# Patient Record
Sex: MALE | Race: WHITE | NOT HISPANIC OR LATINO | Employment: OTHER | URBAN - METROPOLITAN AREA
[De-identification: names, ages, dates, MRNs, and addresses within clinical notes are randomized per-mention and may not be internally consistent; named-entity substitution may affect disease eponyms.]

---

## 2018-09-24 ENCOUNTER — APPOINTMENT (OUTPATIENT)
Dept: LAB | Facility: HOSPITAL | Age: 63
End: 2018-09-24
Payer: MEDICARE

## 2018-09-24 ENCOUNTER — TRANSCRIBE ORDERS (OUTPATIENT)
Dept: ADMINISTRATIVE | Facility: HOSPITAL | Age: 63
End: 2018-09-24

## 2018-09-24 DIAGNOSIS — Z12.5 SPECIAL SCREENING FOR MALIGNANT NEOPLASM OF PROSTATE: ICD-10-CM

## 2018-09-24 DIAGNOSIS — E78.5 HYPERLIPIDEMIA, UNSPECIFIED HYPERLIPIDEMIA TYPE: ICD-10-CM

## 2018-09-24 DIAGNOSIS — E55.9 AVITAMINOSIS D: ICD-10-CM

## 2018-09-24 DIAGNOSIS — N18.30 CHRONIC KIDNEY DISEASE, STAGE III (MODERATE) (HCC): ICD-10-CM

## 2018-09-24 DIAGNOSIS — N18.30 CHRONIC KIDNEY DISEASE, STAGE III (MODERATE) (HCC): Primary | ICD-10-CM

## 2018-09-24 DIAGNOSIS — I10 ESSENTIAL HYPERTENSION, MALIGNANT: ICD-10-CM

## 2018-09-24 LAB
25(OH)D3 SERPL-MCNC: 27.8 NG/ML (ref 30–100)
ALBUMIN SERPL BCP-MCNC: 3.7 G/DL (ref 3.5–5)
ALP SERPL-CCNC: 73 U/L (ref 46–116)
ALT SERPL W P-5'-P-CCNC: 66 U/L (ref 12–78)
ANION GAP SERPL CALCULATED.3IONS-SCNC: 7 MMOL/L (ref 4–13)
AST SERPL W P-5'-P-CCNC: 61 U/L (ref 5–45)
BACTERIA UR QL AUTO: ABNORMAL /HPF
BASOPHILS # BLD AUTO: 0.03 THOUSANDS/ΜL (ref 0–0.1)
BASOPHILS NFR BLD AUTO: 0 % (ref 0–1)
BILIRUB SERPL-MCNC: 0.6 MG/DL (ref 0.2–1)
BILIRUB UR QL STRIP: NEGATIVE
BUN SERPL-MCNC: 23 MG/DL (ref 5–25)
CALCIUM SERPL-MCNC: 8.8 MG/DL (ref 8.3–10.1)
CHLORIDE SERPL-SCNC: 102 MMOL/L (ref 100–108)
CHOLEST SERPL-MCNC: 275 MG/DL (ref 50–200)
CLARITY UR: CLEAR
CO2 SERPL-SCNC: 31 MMOL/L (ref 21–32)
COLOR UR: YELLOW
CREAT SERPL-MCNC: 1.8 MG/DL (ref 0.6–1.3)
EOSINOPHIL # BLD AUTO: 0.07 THOUSAND/ΜL (ref 0–0.61)
EOSINOPHIL NFR BLD AUTO: 1 % (ref 0–6)
ERYTHROCYTE [DISTWIDTH] IN BLOOD BY AUTOMATED COUNT: 14.2 % (ref 11.6–15.1)
GFR SERPL CREATININE-BSD FRML MDRD: 39 ML/MIN/1.73SQ M
GLUCOSE P FAST SERPL-MCNC: 93 MG/DL (ref 65–99)
GLUCOSE UR STRIP-MCNC: NEGATIVE MG/DL
HCT VFR BLD AUTO: 33.2 % (ref 36.5–49.3)
HDLC SERPL-MCNC: 38 MG/DL (ref 40–60)
HGB BLD-MCNC: 11 G/DL (ref 12–17)
HGB UR QL STRIP.AUTO: NEGATIVE
IMM GRANULOCYTES # BLD AUTO: 0.02 THOUSAND/UL (ref 0–0.2)
IMM GRANULOCYTES NFR BLD AUTO: 0 % (ref 0–2)
KETONES UR STRIP-MCNC: NEGATIVE MG/DL
LDLC SERPL CALC-MCNC: 211 MG/DL (ref 0–100)
LEUKOCYTE ESTERASE UR QL STRIP: NEGATIVE
LYMPHOCYTES # BLD AUTO: 1.93 THOUSANDS/ΜL (ref 0.6–4.47)
LYMPHOCYTES NFR BLD AUTO: 27 % (ref 14–44)
MCH RBC QN AUTO: 32.4 PG (ref 26.8–34.3)
MCHC RBC AUTO-ENTMCNC: 33.1 G/DL (ref 31.4–37.4)
MCV RBC AUTO: 98 FL (ref 82–98)
MONOCYTES # BLD AUTO: 0.42 THOUSAND/ΜL (ref 0.17–1.22)
MONOCYTES NFR BLD AUTO: 6 % (ref 4–12)
NEUTROPHILS # BLD AUTO: 4.78 THOUSANDS/ΜL (ref 1.85–7.62)
NEUTS SEG NFR BLD AUTO: 66 % (ref 43–75)
NITRITE UR QL STRIP: NEGATIVE
NON-SQ EPI CELLS URNS QL MICRO: ABNORMAL /HPF
NONHDLC SERPL-MCNC: 237 MG/DL
NRBC BLD AUTO-RTO: 0 /100 WBCS
PH UR STRIP.AUTO: 7 [PH] (ref 4.5–8)
PLATELET # BLD AUTO: 155 THOUSANDS/UL (ref 149–390)
PMV BLD AUTO: 12.4 FL (ref 8.9–12.7)
POTASSIUM SERPL-SCNC: 3.7 MMOL/L (ref 3.5–5.3)
PROT SERPL-MCNC: 8.3 G/DL (ref 6.4–8.2)
PROT UR STRIP-MCNC: ABNORMAL MG/DL
PSA SERPL-MCNC: 2.2 NG/ML (ref 0–4)
RBC # BLD AUTO: 3.4 MILLION/UL (ref 3.88–5.62)
RBC #/AREA URNS AUTO: ABNORMAL /HPF
SODIUM SERPL-SCNC: 140 MMOL/L (ref 136–145)
SP GR UR STRIP.AUTO: 1.01 (ref 1–1.03)
TRIGL SERPL-MCNC: 128 MG/DL
TSH SERPL DL<=0.05 MIU/L-ACNC: 107.56 UIU/ML (ref 0.36–3.74)
UROBILINOGEN UR QL STRIP.AUTO: 0.2 E.U./DL
WBC # BLD AUTO: 7.25 THOUSAND/UL (ref 4.31–10.16)
WBC #/AREA URNS AUTO: ABNORMAL /HPF

## 2018-09-24 PROCEDURE — 80053 COMPREHEN METABOLIC PANEL: CPT

## 2018-09-24 PROCEDURE — 36415 COLL VENOUS BLD VENIPUNCTURE: CPT

## 2018-09-24 PROCEDURE — G0103 PSA SCREENING: HCPCS

## 2018-09-24 PROCEDURE — 86803 HEPATITIS C AB TEST: CPT

## 2018-09-24 PROCEDURE — 84166 PROTEIN E-PHORESIS/URINE/CSF: CPT | Performed by: PATHOLOGY

## 2018-09-24 PROCEDURE — 84166 PROTEIN E-PHORESIS/URINE/CSF: CPT | Performed by: NURSE PRACTITIONER

## 2018-09-24 PROCEDURE — 85025 COMPLETE CBC W/AUTO DIFF WBC: CPT

## 2018-09-24 PROCEDURE — 84443 ASSAY THYROID STIM HORMONE: CPT

## 2018-09-24 PROCEDURE — 82306 VITAMIN D 25 HYDROXY: CPT

## 2018-09-24 PROCEDURE — 80061 LIPID PANEL: CPT

## 2018-09-24 PROCEDURE — 81001 URINALYSIS AUTO W/SCOPE: CPT | Performed by: NURSE PRACTITIONER

## 2018-09-25 LAB — HCV AB SER QL: NORMAL

## 2018-09-27 LAB
ALBUMIN UR ELPH-MCNC: 77.3 %
ALPHA1 GLOB MFR UR ELPH: 4.9 %
ALPHA2 GLOB MFR UR ELPH: 4.8 %
B-GLOBULIN MFR UR ELPH: 5.3 %
GAMMA GLOB MFR UR ELPH: 7.7 %
PROT PATTERN UR ELPH-IMP: ABNORMAL
PROT UR-MCNC: 38 MG/DL

## 2018-10-01 ENCOUNTER — TRANSCRIBE ORDERS (OUTPATIENT)
Dept: ADMINISTRATIVE | Facility: HOSPITAL | Age: 63
End: 2018-10-01

## 2018-10-01 ENCOUNTER — APPOINTMENT (OUTPATIENT)
Dept: LAB | Facility: HOSPITAL | Age: 63
End: 2018-10-01
Payer: MEDICARE

## 2018-10-01 DIAGNOSIS — I51.9 MYXEDEMA HEART DISEASE: ICD-10-CM

## 2018-10-01 DIAGNOSIS — N18.9 CHRONIC KIDNEY DISEASE, UNSPECIFIED CKD STAGE: ICD-10-CM

## 2018-10-01 DIAGNOSIS — E03.9 MYXEDEMA HEART DISEASE: ICD-10-CM

## 2018-10-01 DIAGNOSIS — R22.1 LUMP IN NECK: Primary | ICD-10-CM

## 2018-10-01 DIAGNOSIS — R22.1 LUMP IN NECK: ICD-10-CM

## 2018-10-01 DIAGNOSIS — N18.30 CHRONIC KIDNEY DISEASE, STAGE III (MODERATE) (HCC): Primary | ICD-10-CM

## 2018-10-01 LAB
PTH-INTACT SERPL-MCNC: 62.8 PG/ML (ref 18.4–80.1)
T3 SERPL-MCNC: 0.3 NG/ML (ref 0.6–1.8)
T3FREE SERPL-MCNC: 0.78 PG/ML (ref 2.3–4.2)
T4 SERPL-MCNC: 2.1 UG/DL (ref 4.7–13.3)
TSH SERPL DL<=0.05 MIU/L-ACNC: 136.93 UIU/ML (ref 0.36–3.74)

## 2018-10-01 PROCEDURE — 84481 FREE ASSAY (FT-3): CPT

## 2018-10-01 PROCEDURE — 84480 ASSAY TRIIODOTHYRONINE (T3): CPT

## 2018-10-01 PROCEDURE — 84443 ASSAY THYROID STIM HORMONE: CPT

## 2018-10-01 PROCEDURE — 36415 COLL VENOUS BLD VENIPUNCTURE: CPT

## 2018-10-01 PROCEDURE — 84436 ASSAY OF TOTAL THYROXINE: CPT

## 2018-10-01 PROCEDURE — 83970 ASSAY OF PARATHORMONE: CPT

## 2018-10-03 ENCOUNTER — HOSPITAL ENCOUNTER (OUTPATIENT)
Dept: ULTRASOUND IMAGING | Facility: HOSPITAL | Age: 63
Discharge: HOME | End: 2018-10-03
Payer: MEDICARE

## 2018-10-03 DIAGNOSIS — R22.1 LUMP IN NECK: ICD-10-CM

## 2018-10-03 DIAGNOSIS — N18.30 CHRONIC KIDNEY DISEASE, STAGE III (MODERATE) (HCC): ICD-10-CM

## 2018-10-03 PROCEDURE — 76770 US EXAM ABDO BACK WALL COMP: CPT

## 2018-10-03 PROCEDURE — 76536 US EXAM OF HEAD AND NECK: CPT

## 2018-10-30 ENCOUNTER — APPOINTMENT (OUTPATIENT)
Dept: LAB | Facility: HOSPITAL | Age: 63
End: 2018-10-30
Payer: MEDICARE

## 2018-10-30 DIAGNOSIS — N18.30 CHRONIC KIDNEY DISEASE, STAGE III (MODERATE) (HCC): ICD-10-CM

## 2018-10-30 DIAGNOSIS — N18.9 CHRONIC KIDNEY DISEASE, UNSPECIFIED CKD STAGE: ICD-10-CM

## 2018-10-30 LAB
ANION GAP SERPL CALCULATED.3IONS-SCNC: 10 MMOL/L (ref 4–13)
BUN SERPL-MCNC: 26 MG/DL (ref 5–25)
CALCIUM SERPL-MCNC: 9 MG/DL (ref 8.3–10.1)
CHLORIDE SERPL-SCNC: 101 MMOL/L (ref 100–108)
CO2 SERPL-SCNC: 30 MMOL/L (ref 21–32)
CREAT SERPL-MCNC: 1.84 MG/DL (ref 0.6–1.3)
GFR SERPL CREATININE-BSD FRML MDRD: 38 ML/MIN/1.73SQ M
GLUCOSE SERPL-MCNC: 133 MG/DL (ref 65–140)
POTASSIUM SERPL-SCNC: 4 MMOL/L (ref 3.5–5.3)
SODIUM SERPL-SCNC: 141 MMOL/L (ref 136–145)

## 2018-10-30 PROCEDURE — 80048 BASIC METABOLIC PNL TOTAL CA: CPT

## 2018-10-30 PROCEDURE — 36415 COLL VENOUS BLD VENIPUNCTURE: CPT

## 2018-11-09 ENCOUNTER — HOSPITAL ENCOUNTER (OUTPATIENT)
Dept: CT IMAGING | Facility: HOSPITAL | Age: 63
Discharge: HOME | End: 2018-11-09
Payer: MEDICARE

## 2018-11-09 ENCOUNTER — TRANSCRIBE ORDERS (OUTPATIENT)
Dept: ADMINISTRATIVE | Facility: HOSPITAL | Age: 63
End: 2018-11-09

## 2018-11-09 DIAGNOSIS — N28.89 RENAL MASS: Primary | ICD-10-CM

## 2018-11-09 DIAGNOSIS — N28.89 RENAL MASS: ICD-10-CM

## 2018-11-09 PROCEDURE — 74176 CT ABD & PELVIS W/O CONTRAST: CPT

## 2018-11-21 ENCOUNTER — APPOINTMENT (OUTPATIENT)
Dept: LAB | Facility: HOSPITAL | Age: 63
End: 2018-11-21
Payer: MEDICARE

## 2018-11-21 ENCOUNTER — TRANSCRIBE ORDERS (OUTPATIENT)
Dept: ADMINISTRATIVE | Facility: HOSPITAL | Age: 63
End: 2018-11-21

## 2018-11-21 DIAGNOSIS — E55.9 AVITAMINOSIS D: Primary | ICD-10-CM

## 2018-11-21 DIAGNOSIS — E55.9 AVITAMINOSIS D: ICD-10-CM

## 2018-11-21 LAB
25(OH)D3 SERPL-MCNC: 35.4 NG/ML (ref 30–100)
TSH SERPL DL<=0.05 MIU/L-ACNC: 94.46 UIU/ML (ref 0.36–3.74)

## 2018-11-21 PROCEDURE — 84443 ASSAY THYROID STIM HORMONE: CPT

## 2018-11-21 PROCEDURE — 36415 COLL VENOUS BLD VENIPUNCTURE: CPT

## 2018-11-21 PROCEDURE — 82306 VITAMIN D 25 HYDROXY: CPT

## 2019-02-01 ENCOUNTER — APPOINTMENT (OUTPATIENT)
Dept: LAB | Facility: HOSPITAL | Age: 64
End: 2019-02-01
Payer: MEDICARE

## 2019-02-01 ENCOUNTER — TRANSCRIBE ORDERS (OUTPATIENT)
Dept: ADMINISTRATIVE | Facility: HOSPITAL | Age: 64
End: 2019-02-01

## 2019-02-01 DIAGNOSIS — I51.9 MYXEDEMA HEART DISEASE: Primary | ICD-10-CM

## 2019-02-01 DIAGNOSIS — E03.9 MYXEDEMA HEART DISEASE: ICD-10-CM

## 2019-02-01 DIAGNOSIS — E03.9 MYXEDEMA HEART DISEASE: Primary | ICD-10-CM

## 2019-02-01 DIAGNOSIS — I51.9 MYXEDEMA HEART DISEASE: ICD-10-CM

## 2019-02-01 LAB — TSH SERPL DL<=0.05 MIU/L-ACNC: 13.12 UIU/ML (ref 0.36–3.74)

## 2019-02-01 PROCEDURE — 36415 COLL VENOUS BLD VENIPUNCTURE: CPT

## 2019-02-01 PROCEDURE — 84443 ASSAY THYROID STIM HORMONE: CPT

## 2019-04-03 ENCOUNTER — CONSULT (OUTPATIENT)
Dept: NEPHROLOGY | Facility: CLINIC | Age: 64
End: 2019-04-03
Payer: MEDICARE

## 2019-04-03 ENCOUNTER — TELEPHONE (OUTPATIENT)
Dept: CARDIOLOGY CLINIC | Facility: CLINIC | Age: 64
End: 2019-04-03

## 2019-04-03 ENCOUNTER — APPOINTMENT (OUTPATIENT)
Dept: LAB | Facility: HOSPITAL | Age: 64
End: 2019-04-03
Attending: INTERNAL MEDICINE
Payer: MEDICARE

## 2019-04-03 VITALS
WEIGHT: 179.2 LBS | TEMPERATURE: 98 F | HEIGHT: 68 IN | DIASTOLIC BLOOD PRESSURE: 100 MMHG | HEART RATE: 60 BPM | BODY MASS INDEX: 27.16 KG/M2 | SYSTOLIC BLOOD PRESSURE: 180 MMHG

## 2019-04-03 DIAGNOSIS — N18.30 HYPERTENSIVE KIDNEY DISEASE WITH STAGE 3 CHRONIC KIDNEY DISEASE (HCC): ICD-10-CM

## 2019-04-03 DIAGNOSIS — I12.9 HYPERTENSIVE KIDNEY DISEASE WITH STAGE 3 CHRONIC KIDNEY DISEASE (HCC): ICD-10-CM

## 2019-04-03 DIAGNOSIS — N18.30 STAGE 3 CHRONIC KIDNEY DISEASE (HCC): Primary | ICD-10-CM

## 2019-04-03 DIAGNOSIS — N28.9 RENAL LESION: ICD-10-CM

## 2019-04-03 DIAGNOSIS — N18.30 STAGE 3 CHRONIC KIDNEY DISEASE (HCC): ICD-10-CM

## 2019-04-03 PROBLEM — E78.5 HYPERLIPIDEMIA: Status: ACTIVE | Noted: 2019-04-03

## 2019-04-03 PROBLEM — E03.9 HYPOTHYROIDISM: Status: ACTIVE | Noted: 2019-04-03

## 2019-04-03 LAB
25(OH)D3 SERPL-MCNC: 30.7 NG/ML (ref 30–100)
ANION GAP SERPL CALCULATED.3IONS-SCNC: 9 MMOL/L (ref 4–13)
BACTERIA UR QL AUTO: ABNORMAL /HPF
BASOPHILS # BLD AUTO: 0.05 THOUSANDS/ΜL (ref 0–0.1)
BASOPHILS NFR BLD AUTO: 1 % (ref 0–1)
BILIRUB UR QL STRIP: NEGATIVE
BUN SERPL-MCNC: 19 MG/DL (ref 5–25)
CALCIUM SERPL-MCNC: 9.7 MG/DL (ref 8.3–10.1)
CHLORIDE SERPL-SCNC: 106 MMOL/L (ref 100–108)
CLARITY UR: CLEAR
CO2 SERPL-SCNC: 30 MMOL/L (ref 21–32)
COLOR UR: YELLOW
CREAT SERPL-MCNC: 1.91 MG/DL (ref 0.6–1.3)
CREAT UR-MCNC: 22.3 MG/DL
EOSINOPHIL # BLD AUTO: 0.09 THOUSAND/ΜL (ref 0–0.61)
EOSINOPHIL NFR BLD AUTO: 1 % (ref 0–6)
ERYTHROCYTE [DISTWIDTH] IN BLOOD BY AUTOMATED COUNT: 15.2 % (ref 11.6–15.1)
GFR SERPL CREATININE-BSD FRML MDRD: 36 ML/MIN/1.73SQ M
GLUCOSE P FAST SERPL-MCNC: 87 MG/DL (ref 65–99)
GLUCOSE UR STRIP-MCNC: NEGATIVE MG/DL
HCT VFR BLD AUTO: 41.6 % (ref 36.5–49.3)
HGB BLD-MCNC: 13.3 G/DL (ref 12–17)
HGB UR QL STRIP.AUTO: NEGATIVE
IMM GRANULOCYTES # BLD AUTO: 0.05 THOUSAND/UL (ref 0–0.2)
IMM GRANULOCYTES NFR BLD AUTO: 1 % (ref 0–2)
KETONES UR STRIP-MCNC: NEGATIVE MG/DL
LEUKOCYTE ESTERASE UR QL STRIP: NEGATIVE
LYMPHOCYTES # BLD AUTO: 2.59 THOUSANDS/ΜL (ref 0.6–4.47)
LYMPHOCYTES NFR BLD AUTO: 28 % (ref 14–44)
MCH RBC QN AUTO: 30.4 PG (ref 26.8–34.3)
MCHC RBC AUTO-ENTMCNC: 32 G/DL (ref 31.4–37.4)
MCV RBC AUTO: 95 FL (ref 82–98)
MICROALBUMIN UR-MCNC: 183 MG/L (ref 0–20)
MICROALBUMIN/CREAT 24H UR: 821 MG/G CREATININE (ref 0–30)
MONOCYTES # BLD AUTO: 0.57 THOUSAND/ΜL (ref 0.17–1.22)
MONOCYTES NFR BLD AUTO: 6 % (ref 4–12)
NEUTROPHILS # BLD AUTO: 5.81 THOUSANDS/ΜL (ref 1.85–7.62)
NEUTS SEG NFR BLD AUTO: 63 % (ref 43–75)
NITRITE UR QL STRIP: NEGATIVE
NON-SQ EPI CELLS URNS QL MICRO: ABNORMAL /HPF
NRBC BLD AUTO-RTO: 0 /100 WBCS
PH UR STRIP.AUTO: 7 [PH]
PHOSPHATE SERPL-MCNC: 3.5 MG/DL (ref 2.3–4.1)
PLATELET # BLD AUTO: 138 THOUSANDS/UL (ref 149–390)
PMV BLD AUTO: 13.2 FL (ref 8.9–12.7)
POTASSIUM SERPL-SCNC: 4.4 MMOL/L (ref 3.5–5.3)
PROT UR STRIP-MCNC: ABNORMAL MG/DL
PTH-INTACT SERPL-MCNC: 48.6 PG/ML (ref 18.4–80.1)
RBC # BLD AUTO: 4.38 MILLION/UL (ref 3.88–5.62)
RBC #/AREA URNS AUTO: ABNORMAL /HPF
SODIUM SERPL-SCNC: 145 MMOL/L (ref 136–145)
SP GR UR STRIP.AUTO: 1.01 (ref 1–1.03)
URATE SERPL-MCNC: 6.8 MG/DL (ref 4.2–8)
UROBILINOGEN UR QL STRIP.AUTO: 0.2 E.U./DL
WBC # BLD AUTO: 9.16 THOUSAND/UL (ref 4.31–10.16)
WBC #/AREA URNS AUTO: ABNORMAL /HPF

## 2019-04-03 PROCEDURE — 82043 UR ALBUMIN QUANTITATIVE: CPT

## 2019-04-03 PROCEDURE — 82570 ASSAY OF URINE CREATININE: CPT

## 2019-04-03 PROCEDURE — 99204 OFFICE O/P NEW MOD 45 MIN: CPT | Performed by: INTERNAL MEDICINE

## 2019-04-03 PROCEDURE — 83970 ASSAY OF PARATHORMONE: CPT

## 2019-04-03 PROCEDURE — 84550 ASSAY OF BLOOD/URIC ACID: CPT

## 2019-04-03 PROCEDURE — 82306 VITAMIN D 25 HYDROXY: CPT

## 2019-04-03 PROCEDURE — 80048 BASIC METABOLIC PNL TOTAL CA: CPT

## 2019-04-03 PROCEDURE — 36415 COLL VENOUS BLD VENIPUNCTURE: CPT

## 2019-04-03 PROCEDURE — 81001 URINALYSIS AUTO W/SCOPE: CPT

## 2019-04-03 PROCEDURE — 84100 ASSAY OF PHOSPHORUS: CPT

## 2019-04-03 PROCEDURE — 85025 COMPLETE CBC W/AUTO DIFF WBC: CPT

## 2019-04-03 RX ORDER — LEVOTHYROXINE SODIUM 0.03 MG/1
75 TABLET ORAL DAILY
Refills: 3 | COMMUNITY
Start: 2019-03-20

## 2019-04-03 RX ORDER — DIPHENHYDRAMINE HYDROCHLORIDE 25 MG/1
5 TABLET ORAL EVERY OTHER DAY
COMMUNITY

## 2019-04-03 RX ORDER — RIBOFLAVIN (VITAMIN B2) 100 MG
400 TABLET ORAL DAILY
COMMUNITY

## 2019-04-03 RX ORDER — ZINC SULFATE 50(220)MG
220 CAPSULE ORAL DAILY
COMMUNITY

## 2019-04-03 RX ORDER — LISINOPRIL 10 MG/1
10 TABLET ORAL DAILY
Refills: 3 | COMMUNITY
Start: 2019-03-17 | End: 2019-11-18 | Stop reason: ALTCHOICE

## 2019-04-03 RX ORDER — MELATONIN
1000 DAILY
COMMUNITY

## 2019-04-03 RX ORDER — MULTIVIT WITH MINERALS/LUTEIN
250 TABLET ORAL DAILY
COMMUNITY

## 2019-05-07 ENCOUNTER — TRANSCRIBE ORDERS (OUTPATIENT)
Dept: ADMINISTRATIVE | Facility: HOSPITAL | Age: 64
End: 2019-05-07

## 2019-05-07 ENCOUNTER — APPOINTMENT (OUTPATIENT)
Dept: LAB | Facility: HOSPITAL | Age: 64
End: 2019-05-07
Payer: MEDICARE

## 2019-05-07 DIAGNOSIS — I10 ESSENTIAL HYPERTENSION, MALIGNANT: Primary | ICD-10-CM

## 2019-05-07 DIAGNOSIS — E03.9 MYXEDEMA HEART DISEASE: ICD-10-CM

## 2019-05-07 DIAGNOSIS — I10 ESSENTIAL HYPERTENSION, MALIGNANT: ICD-10-CM

## 2019-05-07 DIAGNOSIS — E78.00 PURE HYPERCHOLESTEROLEMIA: ICD-10-CM

## 2019-05-07 DIAGNOSIS — I51.9 MYXEDEMA HEART DISEASE: ICD-10-CM

## 2019-05-07 LAB
ALBUMIN SERPL BCP-MCNC: 3.9 G/DL (ref 3.5–5)
ALP SERPL-CCNC: 59 U/L (ref 46–116)
ALT SERPL W P-5'-P-CCNC: 23 U/L (ref 12–78)
ANION GAP SERPL CALCULATED.3IONS-SCNC: 8 MMOL/L (ref 4–13)
AST SERPL W P-5'-P-CCNC: 31 U/L (ref 5–45)
BASOPHILS # BLD AUTO: 0.03 THOUSANDS/ΜL (ref 0–0.1)
BASOPHILS NFR BLD AUTO: 0 % (ref 0–1)
BILIRUB SERPL-MCNC: 0.6 MG/DL (ref 0.2–1)
BUN SERPL-MCNC: 18 MG/DL (ref 5–25)
CALCIUM SERPL-MCNC: 9.1 MG/DL (ref 8.3–10.1)
CHLORIDE SERPL-SCNC: 101 MMOL/L (ref 100–108)
CO2 SERPL-SCNC: 29 MMOL/L (ref 21–32)
CREAT SERPL-MCNC: 2.04 MG/DL (ref 0.6–1.3)
EOSINOPHIL # BLD AUTO: 0.09 THOUSAND/ΜL (ref 0–0.61)
EOSINOPHIL NFR BLD AUTO: 1 % (ref 0–6)
ERYTHROCYTE [DISTWIDTH] IN BLOOD BY AUTOMATED COUNT: 15.8 % (ref 11.6–15.1)
GFR SERPL CREATININE-BSD FRML MDRD: 33 ML/MIN/1.73SQ M
GLUCOSE SERPL-MCNC: 91 MG/DL (ref 65–140)
HCT VFR BLD AUTO: 36.5 % (ref 36.5–49.3)
HGB BLD-MCNC: 11.8 G/DL (ref 12–17)
IMM GRANULOCYTES # BLD AUTO: 0.02 THOUSAND/UL (ref 0–0.2)
IMM GRANULOCYTES NFR BLD AUTO: 0 % (ref 0–2)
LYMPHOCYTES # BLD AUTO: 1.98 THOUSANDS/ΜL (ref 0.6–4.47)
LYMPHOCYTES NFR BLD AUTO: 29 % (ref 14–44)
MCH RBC QN AUTO: 30.7 PG (ref 26.8–34.3)
MCHC RBC AUTO-ENTMCNC: 32.3 G/DL (ref 31.4–37.4)
MCV RBC AUTO: 95 FL (ref 82–98)
MONOCYTES # BLD AUTO: 0.41 THOUSAND/ΜL (ref 0.17–1.22)
MONOCYTES NFR BLD AUTO: 6 % (ref 4–12)
NEUTROPHILS # BLD AUTO: 4.34 THOUSANDS/ΜL (ref 1.85–7.62)
NEUTS SEG NFR BLD AUTO: 64 % (ref 43–75)
NRBC BLD AUTO-RTO: 0 /100 WBCS
PLATELET # BLD AUTO: 168 THOUSANDS/UL (ref 149–390)
PMV BLD AUTO: 12.8 FL (ref 8.9–12.7)
POTASSIUM SERPL-SCNC: 4 MMOL/L (ref 3.5–5.3)
PROT SERPL-MCNC: 8.4 G/DL (ref 6.4–8.2)
RBC # BLD AUTO: 3.84 MILLION/UL (ref 3.88–5.62)
SODIUM SERPL-SCNC: 138 MMOL/L (ref 136–145)
TSH SERPL DL<=0.05 MIU/L-ACNC: 133.89 UIU/ML (ref 0.36–3.74)
WBC # BLD AUTO: 6.87 THOUSAND/UL (ref 4.31–10.16)

## 2019-05-07 PROCEDURE — 86376 MICROSOMAL ANTIBODY EACH: CPT

## 2019-05-07 PROCEDURE — 84443 ASSAY THYROID STIM HORMONE: CPT

## 2019-05-07 PROCEDURE — 36415 COLL VENOUS BLD VENIPUNCTURE: CPT

## 2019-05-07 PROCEDURE — 85025 COMPLETE CBC W/AUTO DIFF WBC: CPT

## 2019-05-07 PROCEDURE — 80053 COMPREHEN METABOLIC PANEL: CPT

## 2019-05-07 PROCEDURE — 86800 THYROGLOBULIN ANTIBODY: CPT

## 2019-05-08 ENCOUNTER — LAB (OUTPATIENT)
Dept: LAB | Facility: HOSPITAL | Age: 64
End: 2019-05-08
Payer: MEDICARE

## 2019-05-08 LAB
CHOLEST SERPL-MCNC: 292 MG/DL (ref 50–200)
HDLC SERPL-MCNC: 42 MG/DL (ref 40–60)
LDLC SERPL CALC-MCNC: 222 MG/DL (ref 0–100)
NONHDLC SERPL-MCNC: 250 MG/DL
THYROGLOB AB SERPL-ACNC: >2250 IU/ML (ref 0–0.9)
THYROPEROXIDASE AB SERPL-ACNC: >600 IU/ML (ref 0–34)
TRIGL SERPL-MCNC: 138 MG/DL

## 2019-05-08 PROCEDURE — 36415 COLL VENOUS BLD VENIPUNCTURE: CPT

## 2019-05-08 PROCEDURE — 80061 LIPID PANEL: CPT

## 2019-06-17 ENCOUNTER — APPOINTMENT (OUTPATIENT)
Dept: LAB | Facility: HOSPITAL | Age: 64
End: 2019-06-17
Attending: INTERNAL MEDICINE
Payer: MEDICARE

## 2019-06-17 DIAGNOSIS — N18.30 STAGE 3 CHRONIC KIDNEY DISEASE (HCC): ICD-10-CM

## 2019-06-17 LAB
ANION GAP SERPL CALCULATED.3IONS-SCNC: 9 MMOL/L (ref 4–13)
BASOPHILS # BLD AUTO: 0.04 THOUSANDS/ΜL (ref 0–0.1)
BASOPHILS NFR BLD AUTO: 1 % (ref 0–1)
BILIRUB UR QL STRIP: NEGATIVE
BUN SERPL-MCNC: 30 MG/DL (ref 5–25)
CALCIUM SERPL-MCNC: 8.7 MG/DL (ref 8.3–10.1)
CHLORIDE SERPL-SCNC: 101 MMOL/L (ref 100–108)
CLARITY UR: CLEAR
CO2 SERPL-SCNC: 28 MMOL/L (ref 21–32)
COLOR UR: YELLOW
CREAT SERPL-MCNC: 2.07 MG/DL (ref 0.6–1.3)
CREAT UR-MCNC: 101 MG/DL
EOSINOPHIL # BLD AUTO: 0.12 THOUSAND/ΜL (ref 0–0.61)
EOSINOPHIL NFR BLD AUTO: 2 % (ref 0–6)
ERYTHROCYTE [DISTWIDTH] IN BLOOD BY AUTOMATED COUNT: 15.2 % (ref 11.6–15.1)
GFR SERPL CREATININE-BSD FRML MDRD: 32 ML/MIN/1.73SQ M
GLUCOSE SERPL-MCNC: 78 MG/DL (ref 65–140)
GLUCOSE UR STRIP-MCNC: NEGATIVE MG/DL
HCT VFR BLD AUTO: 38.7 % (ref 36.5–49.3)
HGB BLD-MCNC: 12.6 G/DL (ref 12–17)
HGB UR QL STRIP.AUTO: NEGATIVE
IMM GRANULOCYTES # BLD AUTO: 0.03 THOUSAND/UL (ref 0–0.2)
IMM GRANULOCYTES NFR BLD AUTO: 0 % (ref 0–2)
KETONES UR STRIP-MCNC: NEGATIVE MG/DL
LEUKOCYTE ESTERASE UR QL STRIP: NEGATIVE
LYMPHOCYTES # BLD AUTO: 2.9 THOUSANDS/ΜL (ref 0.6–4.47)
LYMPHOCYTES NFR BLD AUTO: 38 % (ref 14–44)
MCH RBC QN AUTO: 31.2 PG (ref 26.8–34.3)
MCHC RBC AUTO-ENTMCNC: 32.6 G/DL (ref 31.4–37.4)
MCV RBC AUTO: 96 FL (ref 82–98)
MICROALBUMIN UR-MCNC: 147 MG/L (ref 0–20)
MICROALBUMIN/CREAT 24H UR: 146 MG/G CREATININE (ref 0–30)
MONOCYTES # BLD AUTO: 0.44 THOUSAND/ΜL (ref 0.17–1.22)
MONOCYTES NFR BLD AUTO: 6 % (ref 4–12)
NEUTROPHILS # BLD AUTO: 4.05 THOUSANDS/ΜL (ref 1.85–7.62)
NEUTS SEG NFR BLD AUTO: 53 % (ref 43–75)
NITRITE UR QL STRIP: NEGATIVE
NRBC BLD AUTO-RTO: 0 /100 WBCS
PH UR STRIP.AUTO: 6 [PH]
PLATELET # BLD AUTO: 141 THOUSANDS/UL (ref 149–390)
PMV BLD AUTO: 12.3 FL (ref 8.9–12.7)
POTASSIUM SERPL-SCNC: 4 MMOL/L (ref 3.5–5.3)
PROT UR STRIP-MCNC: NEGATIVE MG/DL
RBC # BLD AUTO: 4.04 MILLION/UL (ref 3.88–5.62)
SODIUM SERPL-SCNC: 138 MMOL/L (ref 136–145)
SP GR UR STRIP.AUTO: 1.01 (ref 1–1.03)
UROBILINOGEN UR QL STRIP.AUTO: 0.2 E.U./DL
WBC # BLD AUTO: 7.58 THOUSAND/UL (ref 4.31–10.16)

## 2019-06-17 PROCEDURE — 82043 UR ALBUMIN QUANTITATIVE: CPT

## 2019-06-17 PROCEDURE — 81003 URINALYSIS AUTO W/O SCOPE: CPT

## 2019-06-17 PROCEDURE — 36415 COLL VENOUS BLD VENIPUNCTURE: CPT

## 2019-06-17 PROCEDURE — 80048 BASIC METABOLIC PNL TOTAL CA: CPT

## 2019-06-17 PROCEDURE — 85025 COMPLETE CBC W/AUTO DIFF WBC: CPT

## 2019-06-17 PROCEDURE — 82570 ASSAY OF URINE CREATININE: CPT

## 2019-07-15 ENCOUNTER — OFFICE VISIT (OUTPATIENT)
Dept: NEPHROLOGY | Facility: CLINIC | Age: 64
End: 2019-07-15
Payer: MEDICARE

## 2019-07-15 VITALS
RESPIRATION RATE: 16 BRPM | HEIGHT: 68 IN | DIASTOLIC BLOOD PRESSURE: 80 MMHG | SYSTOLIC BLOOD PRESSURE: 126 MMHG | BODY MASS INDEX: 25.76 KG/M2 | HEART RATE: 38 BPM | TEMPERATURE: 98 F | WEIGHT: 170 LBS

## 2019-07-15 DIAGNOSIS — R80.9 PROTEINURIA, UNSPECIFIED TYPE: ICD-10-CM

## 2019-07-15 DIAGNOSIS — N18.30 STAGE 3 CHRONIC KIDNEY DISEASE (HCC): Primary | ICD-10-CM

## 2019-07-15 DIAGNOSIS — N18.30 HYPERTENSIVE KIDNEY DISEASE WITH STAGE 3 CHRONIC KIDNEY DISEASE (HCC): ICD-10-CM

## 2019-07-15 DIAGNOSIS — I12.9 HYPERTENSIVE KIDNEY DISEASE WITH STAGE 3 CHRONIC KIDNEY DISEASE (HCC): ICD-10-CM

## 2019-07-15 PROCEDURE — 99214 OFFICE O/P EST MOD 30 MIN: CPT | Performed by: INTERNAL MEDICINE

## 2019-07-15 NOTE — LETTER
July 15, 2019     Brandon Cr DO  [address] [place] [address]    Patient: Erika Xiong   YOB: 1955   Date of Visit: 7/15/2019       Dear Dr Osiel Márquez: Thank you for referring Dejuan Gomez to me for evaluation  Below are my notes for this consultation  If you have questions, please do not hesitate to call me  I look forward to following your patient along with you  Sincerely,        Porsha Martins MD        CC: No Recipients  Porsha Martins MD  7/15/2019  3:39 PM  Sign at close encounter  R Amanda [age] y o  @[facility] @ YOB: 1955 MRN: [id number]    Encounter: [id number] DATE: 7/15/2019    REASON FOR VISIT: Erika Xiong is a 75 y o male returns to Nephrology office for further management of chronic kidney disease  HPI:    This is a 77-year-old male with retired  from [place] area with past medical history of hypertension, hypothyroidism, returns to the nephrology for further management of CKD stage 3  Upon review of old medical record patient's previously known baseline creatinine was between 1 8-2 0  Patient was also found to have 4 3 cm infrarenal aortic aneurysm and in the interim seen by vascular surgeon  According to patient no surgical intervention necessary at this point but has schedule appointment with vascular surgeon in the future  Patient has hypertension and in the interim patient has been checking blood pressure on regular basis and said that had blood pressure is under well controlled at this point  According to patient his lisinopril was changed to something but does not remember the exact name of the medication  Patient also has hypothyroidism which is under well controlled with the use of levothyroxine  Patient also takes lots of over-the-counter supplementation  Patient denies use of NSAIDs        REVIEW OF SYSTEMS:    Review of Systems   Constitutional: Negative for chills and fever  HENT: Negative for nosebleeds and sore throat  Eyes: Negative for photophobia and pain  Respiratory: Negative for choking and wheezing  Cardiovascular: Negative for chest pain and palpitations  Gastrointestinal: Negative for abdominal pain and blood in stool  Endocrine: Negative for cold intolerance and heat intolerance  Genitourinary: Negative for flank pain and hematuria  Musculoskeletal: Negative for joint swelling and neck pain  Skin: Negative for color change and pallor  Allergic/Immunologic: Negative for environmental allergies and immunocompromised state  Neurological: Negative for seizures and syncope  Hematological: Negative for adenopathy  Does not bruise/bleed easily  Psychiatric/Behavioral: Negative for confusion and suicidal ideas  PAST MEDICAL HISTORY:  Past Medical History:   Diagnosis Date    Aneurysm of infrarenal abdominal aorta ([facility] [address] )     Hypertension        PAST SURGICAL HISTORY:  History reviewed  No pertinent surgical history      SOCIAL HISTORY:  Social History     Substance and Sexual Activity   Alcohol Use Not Currently     Social History     Substance and Sexual Activity   Drug Use Never     Social History     Tobacco Use   Smoking Status Former Smoker   Smokeless Tobacco Former User       FAMILY HISTORY:  Family History   Problem Relation Age of Onset    Heart disease Mother     Cancer Father     Cancer Sister     Cancer Brother     No Known Problems Maternal Aunt     Diabetes Sister        ALLERGY:  No Known Allergies    MEDICATIONS:    Current Outpatient Medications:     Ascorbic Acid (VITAMIN C) 100 MG tablet, Take 400 mg by mouth daily, Disp: , Rfl:     ascorbic acid (VITAMIN C) 250 mg tablet, Take 250 mg by mouth daily, Disp: , Rfl:     Biotin 5 MG CAPS, Take by mouth, Disp: , Rfl:     cholecalciferol (VITAMIN D3) 1,000 units tablet, Take 1,000 Units by mouth daily, Disp: , Rfl:     co-enzyme Q-10 30 MG capsule, Take 30 mg by mouth 3 (three) times a day, Disp: , Rfl:     Cod Liver Oil 1000 MG CAPS, Take by mouth, Disp: , Rfl:     Grape Seed 100 MG CAPS, Take by mouth, Disp: , Rfl:     levothyroxine 25 mcg tablet, TAKE 1 TABLET BY MOUTH EVERY DAY IN THE MORNING ON EMPTY STOMACH, Disp: , Rfl: 3    lisinopril (ZESTRIL) 10 mg tablet, Take 10 mg by mouth daily, Disp: , Rfl: 3    zinc sulfate (ZINCATE) 220 mg capsule, Take 220 mg by mouth daily, Disp: , Rfl:     PHYSICAL EXAM:  Vitals:    07/15/19 1511   BP: 126/80   BP Location: Right arm   Patient Position: Sitting   Cuff Size: Adult   Pulse: (!) 38   Resp: 16   Temp: 98 °F (36 7 °C)   TempSrc: Oral   Weight: 77 1 kg (170 lb)   Height: 5' 8" (1 727 m)     Body mass index is 25 85 kg/m²  Physical Exam   Constitutional: He appears well-nourished  No distress  HENT:   Head: Normocephalic and atraumatic  Eyes: No scleral icterus  Neck: Neck supple  No JVD present  Cardiovascular: Normal rate, S1 normal and S2 normal    Pulmonary/Chest: Effort normal  No accessory muscle usage  No respiratory distress  He has no wheezes  Abdominal: Soft  He exhibits no distension  Musculoskeletal: He exhibits no edema  Right ankle: He exhibits no swelling  Left ankle: He exhibits no swelling  Right hand: He exhibits no laceration  Left hand: He exhibits no laceration  Lymphadenopathy:        Right cervical: No superficial cervical adenopathy present  Left cervical: No superficial cervical adenopathy present  Neurological: He is alert  He is not disoriented  Skin: Skin is warm  No cyanosis  Psychiatric: He is not combative  He does not exhibit a depressed mood  He expresses no suicidal ideation         LAB RESULTS:  Results for orders placed or performed in visit on 06/17/19   CBC and differential   Result Value Ref Range    WBC 7 58 4 31 - 10 16 Thousand/uL    RBC 4 04 3 88 - 5 62 Million/uL    Hemoglobin 12 6 12 0 - 17 0 g/dL    Hematocrit 38 7 36 5 - 49 3 %    MCV 96 82 - 98 fL    MCH 31 2 26 8 - 34 3 pg    MCHC 32 6 31 4 - 37 4 g/dL    RDW 15 2 (H) 11 6 - 15 1 %    MPV 12 3 8 9 - 12 7 fL    Platelets 810 (L) 111 - 390 Thousands/uL    nRBC 0 /100 WBCs    Neutrophils Relative 53 43 - 75 %    Immat GRANS % 0 0 - 2 %    Lymphocytes Relative 38 14 - 44 %    Monocytes Relative 6 4 - 12 %    Eosinophils Relative 2 0 - 6 %    Basophils Relative 1 0 - 1 %    Neutrophils Absolute 4 05 1 85 - 7 62 Thousands/µL    Immature Grans Absolute 0 03 0 00 - 0 20 Thousand/uL    Lymphocytes Absolute 2 90 0 60 - 4 47 Thousands/µL    Monocytes Absolute 0 44 0 17 - 1 22 Thousand/µL    Eosinophils Absolute 0 12 0 00 - 0 61 Thousand/µL    Basophils Absolute 0 04 0 00 - 0 10 Thousands/µL   Basic metabolic panel   Result Value Ref Range    Sodium 138 136 - 145 mmol/L    Potassium 4 0 3 5 - 5 3 mmol/L    Chloride 101 100 - 108 mmol/L    CO2 28 21 - 32 mmol/L    ANION GAP 9 4 - 13 mmol/L    BUN 30 (H) 5 - 25 mg/dL    Creatinine 2 07 (H) 0 60 - 1 30 mg/dL    Glucose 78 65 - 140 mg/dL    Calcium 8 7 8 3 - 10 1 mg/dL    eGFR 32 ml/min/1 73sq m   Urinalysis with reflex to microscopic   Result Value Ref Range    Color, UA Yellow     Clarity, UA Clear     Specific [place], UA 1 010 1 003 - 1 030    pH, UA 6 0 4 5, 5 0, 5 5, 6 0, 6 5, 7 0, 7 5, 8 0    Leukocytes, UA Negative Negative    Nitrite, UA Negative Negative    Protein, UA Negative Negative mg/dl    Glucose, UA Negative Negative mg/dl    Ketones, UA Negative Negative mg/dl    Urobilinogen, UA 0 2 0 2, 1 0 E U /dl E U /dl    Bilirubin, UA Negative Negative    Blood, UA Negative Negative     Labs done on 10/30/2018 showed creatinine 1 84 with EGFR of 38  Renal ultrasound done on 10/3/2018 showed bulge in left renal area with heterogenicity of 4 x 3 cm  CT scan of abdomen and pelvis without IV contrast on 11/9/2018 showed bubbles in left kidney area  4 3 cm infrarenal aortic aneurysm      ASSESSMENT and PLAN:  [place] Cover was seen today for follow-up  Diagnoses and all orders for this visit:    Stage 3 chronic kidney disease ([address] )  -     CBC and differential; Future  -     Basic metabolic panel; Future  -     Urinalysis with microscopic; Future  -     Microalbumin / creatinine urine ratio; Future  -     Phosphorus; Future  -     Uric acid; Future  -     PTH, intact; Future  -     Vitamin D 25 hydroxy; Future    Hypertensive kidney disease with stage 3 chronic kidney disease (HCC)  -     Basic metabolic panel; Future    Proteinuria, unspecified type  -     Urinalysis with microscopic; Future  -     Microalbumin / creatinine urine ratio; Future      1  CKD stage 3  Multifactorial and suspected due to underlying hypertensive nephrosclerosis  Upon review of old medical record patient's baseline creatinine seems to be fluctuating between 1 8-2 0 and in the interim renal function has remained stable and at baseline with current creatinine of 2 07 with EGFR of 32  Currently hypertension is also under well control  Plan to avoid NSAID and recheck renal function before next visit  2  Hypertension in chronic kidney disease  Today's blood pressure was under well control  Patient said that in the interim PCP has changes antihypertensive medication from lisinopril to something else but he does not remember the name of the medication  Patient is also checking blood pressure on regular basis and hypertension seems under well control  Advised patient to bring the bottle of the medications with the next visit so we can update the medication list   For time being plan to continue current antihypertensive regimen  Patient was advised to follow low-salt diet  3  Proteinuria  Multifactorial, in the interim urine microalbumin to creatinine ratio has improved to 146 mg  Patient's hypertension has also improved  Monitor proteinuria with lisinopril 10 mg PO daily  Return to Nephrology office in 4 months    Future labs ordered  Portions of the record may have been created with voice recognition software  Occasional wrong word or "sound a like" substitutions may have occurred due to the inherent limitations of voice recognition software  Read the chart carefully and recognize, using context, where substitutions have occurred  If you have any questions, please contact the dictating provider

## 2019-07-15 NOTE — PROGRESS NOTES
NEPHROLOGY OFFICE FOLLOW-UP  Ry Booth 75 y o  @[facility] @ YOB: 1955 MRN: [id number]    Encounter: [id number] DATE: 7/15/2019    REASON FOR VISIT: Ry Booth is a 75 y o male returns to Nephrology office for further management of chronic kidney disease  HPI:    This is a 68-year-old male with retired  from [place] area with past medical history of hypertension, hypothyroidism, returns to the nephrology for further management of CKD stage 3  Upon review of old medical record patient's previously known baseline creatinine was between 1 8-2 0  Patient was also found to have 4 3 cm infrarenal aortic aneurysm and in the interim seen by vascular surgeon  According to patient no surgical intervention necessary at this point but has schedule appointment with vascular surgeon in the future  Patient has hypertension and in the interim patient has been checking blood pressure on regular basis and said that had blood pressure is under well controlled at this point  According to patient his lisinopril was changed to something but does not remember the exact name of the medication  Patient also has hypothyroidism which is under well controlled with the use of levothyroxine  Patient also takes lots of over-the-counter supplementation  Patient denies use of NSAIDs  REVIEW OF SYSTEMS:    Review of Systems   Constitutional: Negative for chills and fever  HENT: Negative for nosebleeds and sore throat  Eyes: Negative for photophobia and pain  Respiratory: Negative for choking and wheezing  Cardiovascular: Negative for chest pain and palpitations  Gastrointestinal: Negative for abdominal pain and blood in stool  Endocrine: Negative for cold intolerance and heat intolerance  Genitourinary: Negative for flank pain and hematuria  Musculoskeletal: Negative for joint swelling and neck pain  Skin: Negative for color change and pallor     Allergic/Immunologic: Negative for environmental allergies and immunocompromised state  Neurological: Negative for seizures and syncope  Hematological: Negative for adenopathy  Does not bruise/bleed easily  Psychiatric/Behavioral: Negative for confusion and suicidal ideas  PAST MEDICAL HISTORY:  Past Medical History:   Diagnosis Date    Aneurysm of infrarenal abdominal aorta ([address] )     Hypertension        PAST SURGICAL HISTORY:  History reviewed  No pertinent surgical history      SOCIAL HISTORY:  Social History     Substance and Sexual Activity   Alcohol Use Not Currently     Social History     Substance and Sexual Activity   Drug Use Never     Social History     Tobacco Use   Smoking Status Former Smoker   Smokeless Tobacco Former User       FAMILY HISTORY:  Family History   Problem Relation Age of Onset    Heart disease Mother     Cancer Father     Cancer Sister     Cancer Brother     No Known Problems Maternal Aunt     Diabetes Sister        ALLERGY:  No Known Allergies    MEDICATIONS:    Current Outpatient Medications:     Ascorbic Acid (VITAMIN C) 100 MG tablet, Take 400 mg by mouth daily, Disp: , Rfl:     ascorbic acid (VITAMIN C) 250 mg tablet, Take 250 mg by mouth daily, Disp: , Rfl:     Biotin 5 MG CAPS, Take by mouth, Disp: , Rfl:     cholecalciferol (VITAMIN D3) 1,000 units tablet, Take 1,000 Units by mouth daily, Disp: , Rfl:     co-enzyme Q-10 30 MG capsule, Take 30 mg by mouth 3 (three) times a day, Disp: , Rfl:     Cod Liver Oil 1000 MG CAPS, Take by mouth, Disp: , Rfl:     Grape Seed 100 MG CAPS, Take by mouth, Disp: , Rfl:     levothyroxine 25 mcg tablet, TAKE 1 TABLET BY MOUTH EVERY DAY IN THE MORNING ON EMPTY STOMACH, Disp: , Rfl: 3    lisinopril (ZESTRIL) 10 mg tablet, Take 10 mg by mouth daily, Disp: , Rfl: 3    zinc sulfate (ZINCATE) 220 mg capsule, Take 220 mg by mouth daily, Disp: , Rfl:     PHYSICAL EXAM:  Vitals:    07/15/19 1511   BP: 126/80   BP Location: Right arm   Patient Position: Sitting   Cuff Size: Adult   Pulse: (!) 38   Resp: 16   Temp: 98 °F (36 7 °C)   TempSrc: Oral   Weight: 77 1 kg (170 lb)   Height: 5' 8" (1 727 m)     Body mass index is 25 85 kg/m²  Physical Exam   Constitutional: He appears well-nourished  No distress  HENT:   Head: Normocephalic and atraumatic  Eyes: No scleral icterus  Neck: Neck supple  No JVD present  Cardiovascular: Normal rate, S1 normal and S2 normal    Pulmonary/Chest: Effort normal  No accessory muscle usage  No respiratory distress  He has no wheezes  Abdominal: Soft  He exhibits no distension  Musculoskeletal: He exhibits no edema  Right ankle: He exhibits no swelling  Left ankle: He exhibits no swelling  Right hand: He exhibits no laceration  Left hand: He exhibits no laceration  Lymphadenopathy:        Right cervical: No superficial cervical adenopathy present  Left cervical: No superficial cervical adenopathy present  Neurological: He is alert  He is not disoriented  Skin: Skin is warm  No cyanosis  Psychiatric: He is not combative  He does not exhibit a depressed mood  He expresses no suicidal ideation         LAB RESULTS:  Results for orders placed or performed in visit on 06/17/19   CBC and differential   Result Value Ref Range    WBC 7 58 4 31 - 10 16 Thousand/uL    RBC 4 04 3 88 - 5 62 Million/uL    Hemoglobin 12 6 12 0 - 17 0 g/dL    Hematocrit 38 7 36 5 - 49 3 %    MCV 96 82 - 98 fL    MCH 31 2 26 8 - 34 3 pg    MCHC 32 6 31 4 - 37 4 g/dL    RDW 15 2 (H) 11 6 - 15 1 %    MPV 12 3 8 9 - 12 7 fL    Platelets 251 (L) 233 - 390 Thousands/uL    nRBC 0 /100 WBCs    Neutrophils Relative 53 43 - 75 %    Immat GRANS % 0 0 - 2 %    Lymphocytes Relative 38 14 - 44 %    Monocytes Relative 6 4 - 12 %    Eosinophils Relative 2 0 - 6 %    Basophils Relative 1 0 - 1 %    Neutrophils Absolute 4 05 1 85 - 7 62 Thousands/µL    Immature Grans Absolute 0 03 0 00 - 0 20 Thousand/uL    Lymphocytes Absolute 2 90 0 60 - 4 47 Thousands/µL    Monocytes Absolute 0 44 0 17 - 1 22 Thousand/µL    Eosinophils Absolute 0 12 0 00 - 0 61 Thousand/µL    Basophils Absolute 0 04 0 00 - 0 10 Thousands/µL   Basic metabolic panel   Result Value Ref Range    Sodium 138 136 - 145 mmol/L    Potassium 4 0 3 5 - 5 3 mmol/L    Chloride 101 100 - 108 mmol/L    CO2 28 21 - 32 mmol/L    ANION GAP 9 4 - 13 mmol/L    BUN 30 (H) 5 - 25 mg/dL    Creatinine 2 07 (H) 0 60 - 1 30 mg/dL    Glucose 78 65 - 140 mg/dL    Calcium 8 7 8 3 - 10 1 mg/dL    eGFR 32 ml/min/1 73sq m   Urinalysis with reflex to microscopic   Result Value Ref Range    Color, UA Yellow     Clarity, UA Clear     Specific [place], UA 1 010 1 003 - 1 030    pH, UA 6 0 4 5, 5 0, 5 5, 6 0, 6 5, 7 0, 7 5, 8 0    Leukocytes, UA Negative Negative    Nitrite, UA Negative Negative    Protein, UA Negative Negative mg/dl    Glucose, UA Negative Negative mg/dl    Ketones, UA Negative Negative mg/dl    Urobilinogen, UA 0 2 0 2, 1 0 E U /dl E U /dl    Bilirubin, UA Negative Negative    Blood, UA Negative Negative     Labs done on 10/30/2018 showed creatinine 1 84 with EGFR of 38  Renal ultrasound done on 10/3/2018 showed bulge in left renal area with heterogenicity of 4 x 3 cm  CT scan of abdomen and pelvis without IV contrast on 11/9/2018 showed bubbles in left kidney area  4 3 cm infrarenal aortic aneurysm  ASSESSMENT and PLAN:  Sheryl Henderson was seen today for follow-up  Diagnoses and all orders for this visit:    Stage 3 chronic kidney disease ([facility] [address] )  -     CBC and differential; Future  -     Basic metabolic panel; Future  -     Urinalysis with microscopic; Future  -     Microalbumin / creatinine urine ratio; Future  -     Phosphorus; Future  -     Uric acid; Future  -     PTH, intact; Future  -     Vitamin D 25 hydroxy; Future    Hypertensive kidney disease with stage 3 chronic kidney disease (HCC)  -     Basic metabolic panel;  Future    Proteinuria, unspecified type  - Urinalysis with microscopic; Future  -     Microalbumin / creatinine urine ratio; Future      1  CKD stage 3  Multifactorial and suspected due to underlying hypertensive nephrosclerosis  Upon review of old medical record patient's baseline creatinine seems to be fluctuating between 1 8-2 0 and in the interim renal function has remained stable and at baseline with current creatinine of 2 07 with EGFR of 32  Currently hypertension is also under well control  Plan to avoid NSAID and recheck renal function before next visit  2  Hypertension in chronic kidney disease  Today's blood pressure was under well control  Patient said that in the interim PCP has changes antihypertensive medication from lisinopril to something else but he does not remember the name of the medication  Patient is also checking blood pressure on regular basis and hypertension seems under well control  Advised patient to bring the bottle of the medications with the next visit so we can update the medication list   For time being plan to continue current antihypertensive regimen  Patient was advised to follow low-salt diet  3  Proteinuria  Multifactorial, in the interim urine microalbumin to creatinine ratio has improved to 146 mg  Patient's hypertension has also improved  Monitor proteinuria with lisinopril 10 mg PO daily  Return to Nephrology office in 4 months  Future labs ordered  Portions of the record may have been created with voice recognition software  Occasional wrong word or "sound a like" substitutions may have occurred due to the inherent limitations of voice recognition software  Read the chart carefully and recognize, using context, where substitutions have occurred  If you have any questions, please contact the dictating provider

## 2019-10-04 ENCOUNTER — TRANSCRIBE ORDERS (OUTPATIENT)
Dept: ADMINISTRATIVE | Facility: HOSPITAL | Age: 64
End: 2019-10-04

## 2019-10-04 ENCOUNTER — APPOINTMENT (OUTPATIENT)
Dept: LAB | Facility: HOSPITAL | Age: 64
End: 2019-10-04
Payer: MEDICARE

## 2019-10-04 DIAGNOSIS — E78.5 HYPERLIPIDEMIA, UNSPECIFIED HYPERLIPIDEMIA TYPE: ICD-10-CM

## 2019-10-04 DIAGNOSIS — R53.83 OTHER FATIGUE: Primary | ICD-10-CM

## 2019-10-04 DIAGNOSIS — R53.83 OTHER FATIGUE: ICD-10-CM

## 2019-10-04 DIAGNOSIS — E06.5 HYPOTHYROIDISM DUE TO FIBROUS INVASIVE THYROIDITIS: ICD-10-CM

## 2019-10-04 DIAGNOSIS — R73.9 BLOOD GLUCOSE ELEVATED: ICD-10-CM

## 2019-10-04 DIAGNOSIS — E55.9 AVITAMINOSIS D: ICD-10-CM

## 2019-10-04 DIAGNOSIS — E03.8 HYPOTHYROIDISM DUE TO FIBROUS INVASIVE THYROIDITIS: ICD-10-CM

## 2019-10-04 DIAGNOSIS — I10 ESSENTIAL HYPERTENSION, MALIGNANT: ICD-10-CM

## 2019-10-04 DIAGNOSIS — D64.9 ANEMIA, UNSPECIFIED TYPE: ICD-10-CM

## 2019-10-04 LAB
25(OH)D3 SERPL-MCNC: 34.8 NG/ML (ref 30–100)
ALBUMIN SERPL BCP-MCNC: 4.1 G/DL (ref 3.5–5)
ALP SERPL-CCNC: 59 U/L (ref 46–116)
ALT SERPL W P-5'-P-CCNC: 11 U/L (ref 12–78)
ANION GAP SERPL CALCULATED.3IONS-SCNC: 12 MMOL/L (ref 4–13)
AST SERPL W P-5'-P-CCNC: 14 U/L (ref 5–45)
BASOPHILS # BLD AUTO: 0.04 THOUSANDS/ΜL (ref 0–0.1)
BASOPHILS NFR BLD AUTO: 1 % (ref 0–1)
BILIRUB SERPL-MCNC: 0.6 MG/DL (ref 0.2–1)
BUN SERPL-MCNC: 25 MG/DL (ref 5–25)
CALCIUM SERPL-MCNC: 9.1 MG/DL (ref 8.3–10.1)
CHLORIDE SERPL-SCNC: 104 MMOL/L (ref 100–108)
CHOLEST SERPL-MCNC: 248 MG/DL (ref 50–200)
CO2 SERPL-SCNC: 27 MMOL/L (ref 21–32)
CREAT SERPL-MCNC: 1.76 MG/DL (ref 0.6–1.3)
EOSINOPHIL # BLD AUTO: 0.1 THOUSAND/ΜL (ref 0–0.61)
EOSINOPHIL NFR BLD AUTO: 1 % (ref 0–6)
ERYTHROCYTE [DISTWIDTH] IN BLOOD BY AUTOMATED COUNT: 13.8 % (ref 11.6–15.1)
EST. AVERAGE GLUCOSE BLD GHB EST-MCNC: 117 MG/DL
GFR SERPL CREATININE-BSD FRML MDRD: 39 ML/MIN/1.73SQ M
GLUCOSE P FAST SERPL-MCNC: 90 MG/DL (ref 65–99)
HBA1C MFR BLD: 5.7 % (ref 4.2–6.3)
HCT VFR BLD AUTO: 37.5 % (ref 36.5–49.3)
HDLC SERPL-MCNC: 32 MG/DL (ref 40–60)
HGB BLD-MCNC: 12.1 G/DL (ref 12–17)
IMM GRANULOCYTES # BLD AUTO: 0.04 THOUSAND/UL (ref 0–0.2)
IMM GRANULOCYTES NFR BLD AUTO: 1 % (ref 0–2)
LDLC SERPL CALC-MCNC: 177 MG/DL (ref 0–100)
LYMPHOCYTES # BLD AUTO: 1.88 THOUSANDS/ΜL (ref 0.6–4.47)
LYMPHOCYTES NFR BLD AUTO: 23 % (ref 14–44)
MCH RBC QN AUTO: 31.3 PG (ref 26.8–34.3)
MCHC RBC AUTO-ENTMCNC: 32.3 G/DL (ref 31.4–37.4)
MCV RBC AUTO: 97 FL (ref 82–98)
MONOCYTES # BLD AUTO: 0.54 THOUSAND/ΜL (ref 0.17–1.22)
MONOCYTES NFR BLD AUTO: 7 % (ref 4–12)
NEUTROPHILS # BLD AUTO: 5.64 THOUSANDS/ΜL (ref 1.85–7.62)
NEUTS SEG NFR BLD AUTO: 67 % (ref 43–75)
NONHDLC SERPL-MCNC: 216 MG/DL
NRBC BLD AUTO-RTO: 0 /100 WBCS
PLATELET # BLD AUTO: 174 THOUSANDS/UL (ref 149–390)
PMV BLD AUTO: 12.1 FL (ref 8.9–12.7)
POTASSIUM SERPL-SCNC: 4.5 MMOL/L (ref 3.5–5.3)
PROT SERPL-MCNC: 8.6 G/DL (ref 6.4–8.2)
RBC # BLD AUTO: 3.87 MILLION/UL (ref 3.88–5.62)
SODIUM SERPL-SCNC: 143 MMOL/L (ref 136–145)
TRIGL SERPL-MCNC: 193 MG/DL
TSH SERPL DL<=0.05 MIU/L-ACNC: 55.19 UIU/ML (ref 0.36–3.74)
VIT B12 SERPL-MCNC: 2023 PG/ML (ref 100–900)
WBC # BLD AUTO: 8.24 THOUSAND/UL (ref 4.31–10.16)

## 2019-10-04 PROCEDURE — 36415 COLL VENOUS BLD VENIPUNCTURE: CPT

## 2019-10-04 PROCEDURE — 83036 HEMOGLOBIN GLYCOSYLATED A1C: CPT

## 2019-10-04 PROCEDURE — 82306 VITAMIN D 25 HYDROXY: CPT

## 2019-10-04 PROCEDURE — 80061 LIPID PANEL: CPT

## 2019-10-04 PROCEDURE — 80053 COMPREHEN METABOLIC PANEL: CPT

## 2019-10-04 PROCEDURE — 82607 VITAMIN B-12: CPT

## 2019-10-04 PROCEDURE — 84443 ASSAY THYROID STIM HORMONE: CPT

## 2019-10-04 PROCEDURE — 85025 COMPLETE CBC W/AUTO DIFF WBC: CPT

## 2019-11-01 ENCOUNTER — APPOINTMENT (OUTPATIENT)
Dept: LAB | Facility: HOSPITAL | Age: 64
End: 2019-11-01
Payer: MEDICARE

## 2019-11-01 ENCOUNTER — TRANSCRIBE ORDERS (OUTPATIENT)
Dept: ADMINISTRATIVE | Facility: HOSPITAL | Age: 64
End: 2019-11-01

## 2019-11-01 DIAGNOSIS — E03.9 MYXEDEMA HEART DISEASE: Primary | ICD-10-CM

## 2019-11-01 DIAGNOSIS — I51.9 MYXEDEMA HEART DISEASE: Primary | ICD-10-CM

## 2019-11-01 DIAGNOSIS — E03.9 MYXEDEMA HEART DISEASE: ICD-10-CM

## 2019-11-01 DIAGNOSIS — I51.9 MYXEDEMA HEART DISEASE: ICD-10-CM

## 2019-11-01 LAB — TSH SERPL DL<=0.05 MIU/L-ACNC: 12.82 UIU/ML (ref 0.36–3.74)

## 2019-11-01 PROCEDURE — 36415 COLL VENOUS BLD VENIPUNCTURE: CPT

## 2019-11-01 PROCEDURE — 84443 ASSAY THYROID STIM HORMONE: CPT

## 2019-11-04 ENCOUNTER — HOSPITAL ENCOUNTER (EMERGENCY)
Facility: HOSPITAL | Age: 64
Discharge: HOME | End: 2019-11-04
Attending: EMERGENCY MEDICINE | Admitting: EMERGENCY MEDICINE
Payer: MEDICARE

## 2019-11-04 VITALS
DIASTOLIC BLOOD PRESSURE: 83 MMHG | TEMPERATURE: 98.3 F | HEART RATE: 60 BPM | WEIGHT: 169.31 LBS | SYSTOLIC BLOOD PRESSURE: 154 MMHG | RESPIRATION RATE: 16 BRPM | BODY MASS INDEX: 25.74 KG/M2 | OXYGEN SATURATION: 97 %
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
## 2019-11-04 PROCEDURE — 99284 EMERGENCY DEPT VISIT MOD MDM: CPT | Performed by: PHYSICIAN ASSISTANT

## 2019-11-04 PROCEDURE — 99282 EMERGENCY DEPT VISIT SF MDM: CPT

## 2019-11-04 PROCEDURE — 10120 INC&RMVL FB SUBQ TISS SMPL: CPT | Performed by: PHYSICIAN ASSISTANT

## 2019-11-04 RX ORDER — LIDOCAINE HYDROCHLORIDE AND EPINEPHRINE 10; 10 MG/ML; UG/ML
5 INJECTION, SOLUTION INFILTRATION; PERINEURAL ONCE
Status: COMPLETED | OUTPATIENT
Start: 2019-11-04 | End: 2019-11-04

## 2019-11-04 RX ADMIN — LIDOCAINE HYDROCHLORIDE AND EPINEPHRINE 5 ML: 10; 10 INJECTION, SOLUTION INFILTRATION; PERINEURAL at 13:42

## 2019-11-04 NOTE — ED PROVIDER NOTES
History  Chief Complaint   Patient presents with    Tick Bite     pt presents for tick bite to left shoulder last night pt states " I couldn't get the rest of it out  I think it was flat but it was dug in"      75 y o  male with past medical history significant for hypertension, infrarenal abdominal aortic aneurysm presents to ED with chief complaint of tick bite  Onset of symptoms reported as today  Location of symptoms reported as left upper back  Quality is reported as tick bite  Severity is reported as mild  Associated symptoms:  Denies rash  Denies joint swelling  Denies fevers  Denies headache  Modifying factors:  Patient reports he attempted to remove the tick but was unable to get the mouth parts of the skin  This is why he came into the emergency department today  Context:  Patient reports he sustained a tick bite to the left upper back  It was attached for less than a day  He was able to remove the majority of the tick but was unable to get the mouth parts of the skin due to her deplete was imbedded  He comes to the ear requested removal of embedded mouth parts    He denies any systemic symptoms  Reviewed past medical history and visits via [facility]:  No prior visits to this emergency department  History provided by:  Patient   used: No    Tick Bite   Associated symptoms: no fever, no numbness and no rash        Prior to Admission Medications   Prescriptions Last Dose Informant Patient Reported? Taking?    Ascorbic Acid (VITAMIN C) 100 MG tablet  Self Yes No   Sig: Take 400 mg by mouth daily   Biotin 5 MG CAPS  Self Yes No   Sig: Take by mouth   Cod Liver Oil 1000 MG CAPS  Self Yes No   Sig: Take by mouth   Grape Seed 100 MG CAPS  Self Yes No   Sig: Take by mouth   ascorbic acid (VITAMIN C) 250 mg tablet  Self Yes No   Sig: Take 250 mg by mouth daily   cholecalciferol (VITAMIN D3) 1,000 units tablet  Self Yes No   Sig: Take 1,000 Units by mouth daily   co-enzyme Q-10 30 MG capsule  Self Yes No   Sig: Take 30 mg by mouth 3 (three) times a day   levothyroxine 25 mcg tablet  Self Yes No   Sig: TAKE 1 TABLET BY MOUTH EVERY DAY IN THE MORNING ON EMPTY STOMACH   lisinopril (ZESTRIL) 10 mg tablet  Self Yes No   Sig: Take 10 mg by mouth daily   zinc sulfate (ZINCATE) 220 mg capsule  Self Yes No   Sig: Take 220 mg by mouth daily      Facility-Administered Medications: None       Past Medical History:   Diagnosis Date    Aneurysm of infrarenal abdominal aorta (HCC)     Hypertension        History reviewed  No pertinent surgical history  Family History   Problem Relation Age of Onset    Heart disease Mother     Cancer Father     Cancer Sister     Cancer Brother     No Known Problems Maternal Aunt     Diabetes Sister      I have reviewed and agree with the history as documented  Social History     Tobacco Use    Smoking status: Former Smoker    Smokeless tobacco: Former User   Substance Use Topics    Alcohol use: Not Currently    Drug use: Never        Review of Systems   Constitutional: Negative for activity change, appetite change, chills, diaphoresis, fatigue, fever and unexpected weight change  HENT: Negative for congestion, dental problem, drooling, ear discharge, ear pain, facial swelling, hearing loss, mouth sores, nosebleeds, postnasal drip, rhinorrhea, sinus pressure, sinus pain, sneezing, sore throat, tinnitus, trouble swallowing and voice change  Eyes: Negative for photophobia, pain, discharge, redness, itching and visual disturbance  Respiratory: Negative for cough, chest tightness, shortness of breath and wheezing  Cardiovascular: Negative for chest pain, palpitations and leg swelling  Gastrointestinal: Negative for abdominal distention, abdominal pain, constipation, diarrhea, nausea and vomiting  Endocrine: Negative for cold intolerance, heat intolerance, polydipsia, polyphagia and polyuria     Genitourinary: Negative for difficulty urinating, dysuria, flank pain, frequency, hematuria and urgency  Musculoskeletal: Negative for arthralgias, back pain, gait problem, joint swelling, myalgias, neck pain and neck stiffness  Skin: Positive for wound  Negative for color change, pallor and rash  Allergic/Immunologic: Negative for environmental allergies, food allergies and immunocompromised state  Neurological: Negative for dizziness, tremors, seizures, syncope, facial asymmetry, speech difficulty, weakness, light-headedness, numbness and headaches  Hematological: Negative for adenopathy  Does not bruise/bleed easily  Psychiatric/Behavioral: Negative for agitation, confusion and hallucinations  The patient is not nervous/anxious  All other systems reviewed and are negative  Physical Exam  Physical Exam   Constitutional: He is oriented to person, place, and time  He appears well-developed and well-nourished  No distress  /83 (BP Location: Left arm)   Pulse 60   Temp 98 3 °F (36 8 °C) (Oral)   Resp 16   Wt 76 8 kg (169 lb 5 oz)   SpO2 97%   BMI 25 74 kg/m²    HENT:   Head: Normocephalic and atraumatic  Right Ear: External ear normal    Left Ear: External ear normal    Nose: Nose normal    Mouth/Throat: Oropharynx is clear and moist  No oropharyngeal exudate  Eyes: Pupils are equal, round, and reactive to light  Conjunctivae and EOM are normal  Right eye exhibits no discharge  Left eye exhibits no discharge  No scleral icterus  Neck: Normal range of motion  Neck supple  No tracheal deviation present  No thyromegaly present  Cardiovascular: Normal rate, regular rhythm and intact distal pulses  Pulmonary/Chest: Effort normal and breath sounds normal  No stridor  No respiratory distress  He has no wheezes  He has no rales  He exhibits no tenderness  Abdominal: Soft  Bowel sounds are normal  He exhibits no distension and no mass  There is no tenderness  There is no rebound and no guarding     Musculoskeletal: Normal range of motion  He exhibits no edema, tenderness or deformity  Lymphadenopathy:     He has no cervical adenopathy  Neurological: He is alert and oriented to person, place, and time  He displays normal reflexes  No cranial nerve deficit or sensory deficit  He exhibits normal muscle tone  Coordination normal    Skin: Skin is warm and dry  Capillary refill takes less than 2 seconds  No rash noted  He is not diaphoretic  No erythema  No pallor  There is a small area of skin avulsion (0 25cm) present to left upper back  No bleeding, no surrounding erythema  There are 2 small black foreign bodies consistent with tick mouth parts imbedded in the center  Psychiatric: He has a normal mood and affect  His behavior is normal  Judgment and thought content normal    Nursing note and vitals reviewed        Vital Signs  ED Triage Vitals [11/04/19 1315]   Temperature Pulse Respirations Blood Pressure SpO2   98 3 °F (36 8 °C) 60 16 154/83 97 %      Temp Source Heart Rate Source Patient Position - Orthostatic VS BP Location FiO2 (%)   Oral Monitor Sitting Left arm --      Pain Score       No Pain           Vitals:    11/04/19 1315   BP: 154/83   Pulse: 60   Patient Position - Orthostatic VS: Sitting         Visual Acuity      ED Medications  Medications   lidocaine-epinephrine (XYLOCAINE/EPINEPHRINE) 1 %-1:100,000 injection 5 mL (5 mL Infiltration Given 11/4/19 1342)       Diagnostic Studies  Results Reviewed     None                 No orders to display              Procedures  Foreign Body - Embedded  Date/Time: 11/4/2019 1:27 PM  Performed by: Anita Beal PA-C  Authorized by: Anita Beal PA-C     Patient location:  ED  Other Assisting Provider: No    Consent:     Consent obtained:  Verbal    Consent given by:  Patient    Risks discussed:  Bleeding, incomplete removal, nerve damage, infection, pain, poor cosmetic result and worsening of condition    Alternatives discussed:  No treatment  Universal protocol:     Patient identity confirmed:  Verbally with patient  Location:     Location:  Trunk    Trunk location:  Upper back    Tendon involvement:  None  Pre-procedure details:     Imaging:  None    Neurovascular status: intact    Anesthesia (see MAR for exact dosages): Anesthesia method:  Local infiltration    Local anesthetic:  Lidocaine 1% WITH epi  Procedure details:     Scalpel size:  10    Localization method:  Visualized    Dissection of underlying tissues: no      Bloodless field: yes      Removal mechanism: Forceps and scalpel    Removal Method:  Open    Procedure complexity:  Simple    Foreign bodies recovered:  2    Description:  Tick mouth parts    Intact foreign body removal: yes    Post-procedure details:     Neurovascular status: intact      Confirmation:  No additional foreign bodies on visualization    Skin closure:  None    Dressing:  Antibiotic ointment and adhesive bandage    Patient tolerance of procedure: Tolerated well, no immediate complications    Complication (if applicable):  None  Comments:      Scalp will and forceps used to remove small piece of tissue containing to tick mouth parts  Foreign bodies removed in total   No bleeding  xeroform dressing and bandage to plant area  Discussed continued wound care including use of Neosporin and Band-Aid to the area until healed  No indication for antibiotics as tick attached less than 24 hours  Discussed outpatient follow-up with primary care physician in 4-6 weeks for further Lyme testing if patient is concerned  Reviewed reasons to return to ed  Patient verbalized understanding of diagnosis and agreement with discharge plan of care as well as understanding of reasons to return to ed  ED Course           Identification of Seniors at Risk      Most Recent Value   (ISAR) Identification of Seniors at Risk   Before the illness or injury that brought you to the Emergency, did you need someone to help you on a regular basis?   0 Filed at: 11/04/2019 1314   In the last 24 hours, have you needed more help than usual?  0 Filed at: 11/04/2019 1314   Have you been hospitalized for one or more nights during the past 6 months? 0 Filed at: 11/04/2019 1314   In general, do you see well?  0 Filed at: 11/04/2019 1314   In general, do you have serious problems with your memory? 0 Filed at: 11/04/2019 1314   Do you take more than three different medications every day?  0 Filed at: 11/04/2019 1314   ISAR Score  0 Filed at: 11/04/2019 1314                          MDM  Number of Diagnoses or Management Options  Tick bite, initial encounter: new and does not require workup     Amount and/or Complexity of Data Reviewed  Review and summarize past medical records: yes    Patient Progress  Patient progress: stable      Disposition  Final diagnoses:   Tick bite, initial encounter     Time reflects when diagnosis was documented in both MDM as applicable and the Disposition within this note     Time User Action Codes Description Comment    11/4/2019  1:34 PM Trish Guerra  XXXA] Tick bite, initial encounter       ED Disposition     ED Disposition Condition Date/Time Comment    Discharge Stable Mon Nov 4, 2019  1:34 PM Jaime Gold discharge to home/self care  Follow-up Information     Follow up With Specialties Details Why Contact Info Additional Information    Jaun Singh, DO Family Medicine Call in 1 week for further evaluation of symptoms [address] [place] [address]  97        [facility] Emergency Department Emergency Medicine Go to  If symptoms worsen [address] [facility] [address], [address] [place], [place], [address]          Discharge Medication List as of 11/4/2019  1:34 PM      CONTINUE these medications which have NOT CHANGED    Details   ! !  Ascorbic Acid (VITAMIN C) 100 MG tablet Take 400 mg by mouth daily, Historical Med      !! ascorbic acid (VITAMIN C) 250 mg tablet Take 250 mg by mouth daily, Historical Med      Biotin 5 MG CAPS Take by mouth, Historical Med      cholecalciferol (VITAMIN D3) 1,000 units tablet Take 1,000 Units by mouth daily, Historical Med      co-enzyme Q-10 30 MG capsule Take 30 mg by mouth 3 (three) times a day, Historical Med      Cod Liver Oil 1000 MG CAPS Take by mouth, Historical Med      Grape Seed 100 MG CAPS Take by mouth, Historical Med      levothyroxine 25 mcg tablet TAKE 1 TABLET BY MOUTH EVERY DAY IN THE MORNING ON EMPTY STOMACH, Historical Med      lisinopril (ZESTRIL) 10 mg tablet Take 10 mg by mouth daily, Starting Sun 3/17/2019, Historical Med      zinc sulfate (ZINCATE) 220 mg capsule Take 220 mg by mouth daily, Historical Med       !! - Potential duplicate medications found  Please discuss with provider  No discharge procedures on file      ED Provider  Electronically Signed by           Lulu Shields PA-C  11/04/19 3887

## 2019-11-13 ENCOUNTER — TELEPHONE (OUTPATIENT)
Dept: NEPHROLOGY | Facility: CLINIC | Age: 64
End: 2019-11-13

## 2019-11-15 ENCOUNTER — APPOINTMENT (OUTPATIENT)
Dept: LAB | Facility: HOSPITAL | Age: 64
End: 2019-11-15
Attending: INTERNAL MEDICINE
Payer: MEDICARE

## 2019-11-15 DIAGNOSIS — N18.30 STAGE 3 CHRONIC KIDNEY DISEASE (HCC): ICD-10-CM

## 2019-11-15 DIAGNOSIS — R80.9 PROTEINURIA, UNSPECIFIED TYPE: ICD-10-CM

## 2019-11-15 DIAGNOSIS — I12.9 HYPERTENSIVE KIDNEY DISEASE WITH STAGE 3 CHRONIC KIDNEY DISEASE (HCC): ICD-10-CM

## 2019-11-15 DIAGNOSIS — N18.30 HYPERTENSIVE KIDNEY DISEASE WITH STAGE 3 CHRONIC KIDNEY DISEASE (HCC): ICD-10-CM

## 2019-11-15 LAB
25(OH)D3 SERPL-MCNC: 47.8 NG/ML (ref 30–100)
ANION GAP SERPL CALCULATED.3IONS-SCNC: 9 MMOL/L (ref 4–13)
BACTERIA UR QL AUTO: NORMAL /HPF
BASOPHILS # BLD AUTO: 0.03 THOUSANDS/ΜL (ref 0–0.1)
BASOPHILS NFR BLD AUTO: 0 % (ref 0–1)
BILIRUB UR QL STRIP: NEGATIVE
BUN SERPL-MCNC: 25 MG/DL (ref 5–25)
CALCIUM SERPL-MCNC: 8.6 MG/DL (ref 8.3–10.1)
CHLORIDE SERPL-SCNC: 103 MMOL/L (ref 100–108)
CLARITY UR: CLEAR
CO2 SERPL-SCNC: 26 MMOL/L (ref 21–32)
COLOR UR: YELLOW
CREAT SERPL-MCNC: 1.66 MG/DL (ref 0.6–1.3)
CREAT UR-MCNC: 81.8 MG/DL
EOSINOPHIL # BLD AUTO: 0.17 THOUSAND/ΜL (ref 0–0.61)
EOSINOPHIL NFR BLD AUTO: 2 % (ref 0–6)
ERYTHROCYTE [DISTWIDTH] IN BLOOD BY AUTOMATED COUNT: 13.4 % (ref 11.6–15.1)
GFR SERPL CREATININE-BSD FRML MDRD: 42 ML/MIN/1.73SQ M
GLUCOSE P FAST SERPL-MCNC: 98 MG/DL (ref 65–99)
GLUCOSE UR STRIP-MCNC: NEGATIVE MG/DL
HCT VFR BLD AUTO: 35.4 % (ref 36.5–49.3)
HGB BLD-MCNC: 11.4 G/DL (ref 12–17)
HGB UR QL STRIP.AUTO: NEGATIVE
IMM GRANULOCYTES # BLD AUTO: 0.03 THOUSAND/UL (ref 0–0.2)
IMM GRANULOCYTES NFR BLD AUTO: 0 % (ref 0–2)
KETONES UR STRIP-MCNC: NEGATIVE MG/DL
LEUKOCYTE ESTERASE UR QL STRIP: NEGATIVE
LYMPHOCYTES # BLD AUTO: 2.48 THOUSANDS/ΜL (ref 0.6–4.47)
LYMPHOCYTES NFR BLD AUTO: 30 % (ref 14–44)
MCH RBC QN AUTO: 31 PG (ref 26.8–34.3)
MCHC RBC AUTO-ENTMCNC: 32.2 G/DL (ref 31.4–37.4)
MCV RBC AUTO: 96 FL (ref 82–98)
MICROALBUMIN UR-MCNC: 47.9 MG/L (ref 0–20)
MICROALBUMIN/CREAT 24H UR: 59 MG/G CREATININE (ref 0–30)
MONOCYTES # BLD AUTO: 0.6 THOUSAND/ΜL (ref 0.17–1.22)
MONOCYTES NFR BLD AUTO: 7 % (ref 4–12)
NEUTROPHILS # BLD AUTO: 4.9 THOUSANDS/ΜL (ref 1.85–7.62)
NEUTS SEG NFR BLD AUTO: 61 % (ref 43–75)
NITRITE UR QL STRIP: NEGATIVE
NON-SQ EPI CELLS URNS QL MICRO: NORMAL /HPF
NRBC BLD AUTO-RTO: 0 /100 WBCS
PH UR STRIP.AUTO: 6 [PH]
PHOSPHATE SERPL-MCNC: 4.3 MG/DL (ref 2.3–4.1)
PLATELET # BLD AUTO: 195 THOUSANDS/UL (ref 149–390)
PMV BLD AUTO: 11.8 FL (ref 8.9–12.7)
POTASSIUM SERPL-SCNC: 3.8 MMOL/L (ref 3.5–5.3)
PROT UR STRIP-MCNC: NEGATIVE MG/DL
PTH-INTACT SERPL-MCNC: 48 PG/ML (ref 18.4–80.1)
RBC # BLD AUTO: 3.68 MILLION/UL (ref 3.88–5.62)
RBC #/AREA URNS AUTO: NORMAL /HPF
SODIUM SERPL-SCNC: 138 MMOL/L (ref 136–145)
SP GR UR STRIP.AUTO: <=1.005 (ref 1–1.03)
URATE SERPL-MCNC: 6.3 MG/DL (ref 4.2–8)
UROBILINOGEN UR QL STRIP.AUTO: 0.2 E.U./DL
WBC # BLD AUTO: 8.21 THOUSAND/UL (ref 4.31–10.16)
WBC #/AREA URNS AUTO: NORMAL /HPF

## 2019-11-15 PROCEDURE — 83970 ASSAY OF PARATHORMONE: CPT

## 2019-11-15 PROCEDURE — 82043 UR ALBUMIN QUANTITATIVE: CPT

## 2019-11-15 PROCEDURE — 80048 BASIC METABOLIC PNL TOTAL CA: CPT

## 2019-11-15 PROCEDURE — 36415 COLL VENOUS BLD VENIPUNCTURE: CPT

## 2019-11-15 PROCEDURE — 81001 URINALYSIS AUTO W/SCOPE: CPT

## 2019-11-15 PROCEDURE — 85025 COMPLETE CBC W/AUTO DIFF WBC: CPT

## 2019-11-15 PROCEDURE — 82306 VITAMIN D 25 HYDROXY: CPT

## 2019-11-15 PROCEDURE — 82570 ASSAY OF URINE CREATININE: CPT

## 2019-11-15 PROCEDURE — 84550 ASSAY OF BLOOD/URIC ACID: CPT

## 2019-11-15 PROCEDURE — 84100 ASSAY OF PHOSPHORUS: CPT

## 2019-11-18 ENCOUNTER — OFFICE VISIT (OUTPATIENT)
Dept: NEPHROLOGY | Facility: CLINIC | Age: 64
End: 2019-11-18
Payer: MEDICARE

## 2019-11-18 VITALS
TEMPERATURE: 97.5 F | HEART RATE: 57 BPM | RESPIRATION RATE: 16 BRPM | BODY MASS INDEX: 24.71 KG/M2 | WEIGHT: 163 LBS | SYSTOLIC BLOOD PRESSURE: 134 MMHG | DIASTOLIC BLOOD PRESSURE: 80 MMHG | HEIGHT: 68 IN

## 2019-11-18 DIAGNOSIS — R80.9 PROTEINURIA, UNSPECIFIED TYPE: ICD-10-CM

## 2019-11-18 DIAGNOSIS — N18.30 STAGE 3 CHRONIC KIDNEY DISEASE (HCC): Primary | ICD-10-CM

## 2019-11-18 DIAGNOSIS — I12.9 HYPERTENSIVE KIDNEY DISEASE WITH STAGE 3 CHRONIC KIDNEY DISEASE (HCC): ICD-10-CM

## 2019-11-18 DIAGNOSIS — N18.30 HYPERTENSIVE KIDNEY DISEASE WITH STAGE 3 CHRONIC KIDNEY DISEASE (HCC): ICD-10-CM

## 2019-11-18 PROCEDURE — 99214 OFFICE O/P EST MOD 30 MIN: CPT | Performed by: INTERNAL MEDICINE

## 2019-11-18 RX ORDER — AMLODIPINE BESYLATE AND BENAZEPRIL HYDROCHLORIDE 5; 40 MG/1; MG/1
1 CAPSULE ORAL DAILY
Qty: 30 CAPSULE
Start: 2019-11-18

## 2019-11-18 RX ORDER — VITAMIN B COMPLEX
1 CAPSULE ORAL DAILY
COMMUNITY

## 2019-11-18 RX ORDER — AMLODIPINE BESYLATE AND ATORVASTATIN CALCIUM 5; 40 MG/1; MG/1
1 TABLET, FILM COATED ORAL DAILY
COMMUNITY
End: 2019-11-18 | Stop reason: ALTCHOICE

## 2019-11-18 NOTE — PROGRESS NOTES
NEPHROLOGY OFFICE FOLLOW-UP  Polina Montenegro 75 y o  @[facility] @ YOB: 1955 MRN: [id number]    Encounter: [id number] DATE: 11/18/2019    REASON FOR VISIT: Polina Montenegro is a 75 y o male returns to Nephrology office for further management of chronic kidney disease  HPI:    This is a 49-year-old male with retired  from [place] area with past medical history of hypertension, hypothyroidism, returns to the nephrology for further management of CKD stage 3  Upon review of old medical record patient's previously known baseline creatinine was between 1 8-2 0  Patient was also found to have 4 3 cm infrarenal aortic aneurysm and in the interim seen by vascular surgeon  According to patient no surgical intervention necessary at this point but has schedule appointment with vascular surgeon in the future  Patient has hypertension and in the interim patient has been checking blood pressure on regular basis and said that had blood pressure is under well controlled at this point  Previously patient was taking lisinopril but now taking combination of amlodipine/benazepril 5/40 mg PO daily and his blood pressure is currently under well control  Patient also has hypothyroidism which is under well controlled with the use of levothyroxine  Patient also takes lots of over-the-counter supplementation  Patient denies use of NSAIDs  REVIEW OF SYSTEMS:    Review of Systems   Constitutional: Negative for chills and fever  HENT: Negative for nosebleeds and sore throat  Eyes: Negative for photophobia and pain  Respiratory: Negative for choking and wheezing  Cardiovascular: Negative for chest pain and palpitations  Gastrointestinal: Negative for abdominal pain and blood in stool  Endocrine: Negative for cold intolerance and heat intolerance  Genitourinary: Negative for flank pain and hematuria  Musculoskeletal: Negative for joint swelling and neck pain     Skin: Negative for color change and pallor  Allergic/Immunologic: Negative for environmental allergies and immunocompromised state  Neurological: Negative for seizures and syncope  Hematological: Negative for adenopathy  Does not bruise/bleed easily  Psychiatric/Behavioral: Negative for confusion and suicidal ideas           PAST MEDICAL HISTORY:  Past Medical History:   Diagnosis Date    Aneurysm of infrarenal abdominal aorta ([address] )     Hypertension        PAST SURGICAL HISTORY:  Past Surgical History:   Procedure Laterality Date    LYMPH NODE DISSECTION         SOCIAL HISTORY:  Social History     Substance and Sexual Activity   Alcohol Use Not Currently     Social History     Substance and Sexual Activity   Drug Use Never     Social History     Tobacco Use   Smoking Status Former Smoker   Smokeless Tobacco Former User       FAMILY HISTORY:  Family History   Problem Relation Age of Onset    Heart disease Mother     Cancer Father     Heart disease Father     Diabetes Father     Cancer Sister     Breast cancer Sister     Cancer Brother     No Known Problems Maternal Aunt     Diabetes Sister        ALLERGY:  No Known Allergies    MEDICATIONS:    Current Outpatient Medications:     Ascorbic Acid (VITAMIN C) 100 MG tablet, Take 400 mg by mouth daily, Disp: , Rfl:     ascorbic acid (VITAMIN C) 250 mg tablet, Take 250 mg by mouth daily, Disp: , Rfl:     b complex vitamins capsule, Take 1 capsule by mouth daily, Disp: , Rfl:     Biotin 5 MG CAPS, Take 5 mg by mouth every other day , Disp: , Rfl:     cholecalciferol (VITAMIN D3) 1,000 units tablet, Take 1,000 Units by mouth daily, Disp: , Rfl:     co-enzyme Q-10 30 MG capsule, Take 30 mg by mouth daily , Disp: , Rfl:     Grape Seed 100 MG CAPS, Take by mouth, Disp: , Rfl:     levothyroxine 25 mcg tablet, Take 75 mcg by mouth daily , Disp: , Rfl: 3    Probiotic Product (PROBIOTIC-10 ULTIMATE PO), Take by mouth daily, Disp: , Rfl:     vitamin E 100 UNIT capsule, Take 100 Units by mouth daily, Disp: , Rfl:     zinc sulfate (ZINCATE) 220 mg capsule, Take 220 mg by mouth daily, Disp: , Rfl:     amLODIPine-benazepril (LOTREL) 5-40 MG per capsule, Take 1 capsule by mouth daily, Disp: 30 capsule, Rfl:     PHYSICAL EXAM:  Vitals:    11/18/19 1307   BP: 134/80   BP Location: Left arm   Patient Position: Sitting   Cuff Size: Standard   Pulse: 57   Resp: 16   Temp: 97 5 °F (36 4 °C)   TempSrc: Tympanic   Weight: 73 9 kg (163 lb)   Height: 5' 8" (1 727 m)     Body mass index is 24 78 kg/m²  Physical Exam   Constitutional: He appears well-nourished  No distress  HENT:   Head: Normocephalic and atraumatic  Eyes: No scleral icterus  Neck: Neck supple  No JVD present  Cardiovascular: Normal rate, S1 normal and S2 normal    Pulmonary/Chest: Effort normal  No accessory muscle usage  No respiratory distress  He has no wheezes  Abdominal: Soft  He exhibits no distension  Musculoskeletal: He exhibits no edema  Right ankle: He exhibits no swelling  Left ankle: He exhibits no swelling  Right hand: He exhibits no laceration  Left hand: He exhibits no laceration  Lymphadenopathy:        Right cervical: No superficial cervical adenopathy present  Left cervical: No superficial cervical adenopathy present  Neurological: He is alert  He is not disoriented  Skin: Skin is warm  No cyanosis  Psychiatric: He is not combative  He does not exhibit a depressed mood  He expresses no suicidal ideation         LAB RESULTS:  Results for orders placed or performed in visit on 11/15/19   CBC and differential   Result Value Ref Range    WBC 8 21 4 31 - 10 16 Thousand/uL    RBC 3 68 (L) 3 88 - 5 62 Million/uL    Hemoglobin 11 4 (L) 12 0 - 17 0 g/dL    Hematocrit 35 4 (L) 36 5 - 49 3 %    MCV 96 82 - 98 fL    MCH 31 0 26 8 - 34 3 pg    MCHC 32 2 31 4 - 37 4 g/dL    RDW 13 4 11 6 - 15 1 %    MPV 11 8 8 9 - 12 7 fL    Platelets 204 483 - 367 Thousands/uL    nRBC 0 /100 WBCs    Neutrophils Relative 61 43 - 75 %    Immat GRANS % 0 0 - 2 %    Lymphocytes Relative 30 14 - 44 %    Monocytes Relative 7 4 - 12 %    Eosinophils Relative 2 0 - 6 %    Basophils Relative 0 0 - 1 %    Neutrophils Absolute 4 90 1 85 - 7 62 Thousands/µL    Immature Grans Absolute 0 03 0 00 - 0 20 Thousand/uL    Lymphocytes Absolute 2 48 0 60 - 4 47 Thousands/µL    Monocytes Absolute 0 60 0 17 - 1 22 Thousand/µL    Eosinophils Absolute 0 17 0 00 - 0 61 Thousand/µL    Basophils Absolute 0 03 0 00 - 0 10 Thousands/µL   Basic metabolic panel   Result Value Ref Range    Sodium 138 136 - 145 mmol/L    Potassium 3 8 3 5 - 5 3 mmol/L    Chloride 103 100 - 108 mmol/L    CO2 26 21 - 32 mmol/L    ANION GAP 9 4 - 13 mmol/L    BUN 25 5 - 25 mg/dL    Creatinine 1 66 (H) 0 60 - 1 30 mg/dL    Glucose, Fasting 98 65 - 99 mg/dL    Calcium 8 6 8 3 - 10 1 mg/dL    eGFR 42 ml/min/1 73sq m   Urinalysis with microscopic   Result Value Ref Range    Clarity, UA Clear     Color, UA Yellow     Specific Gravity, UA <=1 005 1 003 - 1 030    pH, UA 6 0 4 5, 5 0, 5 5, 6 0, 6 5, 7 0, 7 5, 8 0    Glucose, UA Negative Negative mg/dl    Ketones, UA Negative Negative mg/dl    Blood, UA Negative Negative    Protein, UA Negative Negative mg/dl    Nitrite, UA Negative Negative    Bilirubin, UA Negative Negative    Urobilinogen, UA 0 2 0 2, 1 0 E U /dl E U /dl    Leukocytes, UA Negative Negative    WBC, UA None Seen None Seen, 0-5, 5-55, 5-65 /hpf    RBC, UA None Seen None Seen, 0-5 /hpf    Bacteria, UA None Seen None Seen, Occasional /hpf    Epithelial Cells None Seen None Seen, Occasional /hpf   Microalbumin / creatinine urine ratio   Result Value Ref Range    Creatinine, Ur 81 8 mg/dL    Microalbum  ,U,Random 47 9 (H) 0 0 - 20 0 mg/L    Microalb Creat Ratio 59 (H) 0 - 30 mg/g creatinine   Phosphorus   Result Value Ref Range    Phosphorus 4 3 (H) 2 3 - 4 1 mg/dL   Uric acid   Result Value Ref Range    Uric Acid 6 3 4 2 - 8 0 mg/dL   PTH, intact Result Value Ref Range    PTH 48 0 18 4 - 80 1 pg/mL   Vitamin D 25 hydroxy   Result Value Ref Range    Vit D, 25-Hydroxy 47 8 30 0 - 100 0 ng/mL       Renal ultrasound done on 10/3/2018 showed bulge in left renal area with heterogenicity of 4 x 3 cm  CT scan of abdomen and pelvis without IV contrast on 11/9/2018 showed bubbles in left kidney area  4 3 cm infrarenal aortic aneurysm  ASSESSMENT and PLAN:  Diagnoses and all orders for this visit:    Stage 3 chronic kidney disease ([address] )  -     CBC and differential; Future  -     Basic metabolic panel; Future  -     Urinalysis with microscopic; Future  -     Microalbumin / creatinine urine ratio; Future  -     Phosphorus; Future  -     Uric acid; Future  -     PTH, intact; Future  -     Vitamin D 25 hydroxy; Future    Hypertensive kidney disease with stage 3 chronic kidney disease (HCC)  -     Basic metabolic panel; Future    Proteinuria, unspecified type  -     Urinalysis with microscopic; Future  -     Microalbumin / creatinine urine ratio; Future      1  CKD stage 3  Multifactorial and suspected due to underlying hypertensive nephrosclerosis  Upon review of old medical record patient's baseline creatinine seems to be fluctuating between 1 8-2 0 and in the interim renal function has improved to current creatinine of 1 66 with EGFR of 42  Currently hypertension is also under well control  Plan to avoid NSAIDs and recheck renal function before next visit  2  Hypertension in chronic kidney disease  Today's blood pressure was under well control and plan to monitor hypertension with amlodipine/benazepril 5/40 mg PO daily  Advised patient to follow low-salt diet  3  Proteinuria  Minimal, suspected due to underlying hypertensive nephrosclerosis  Current urine microalbumin to creatinine ratio is 59 mg which is better compared to previous check  Continue benazepril 40 mg PO daily  Patient is also currently following low protein diet      Return to Nephrology office in 6 months  Future labs ordered  Portions of the record may have been created with voice recognition software  Occasional wrong word or "sound a like" substitutions may have occurred due to the inherent limitations of voice recognition software  Read the chart carefully and recognize, using context, where substitutions have occurred  If you have any questions, please contact the dictating provider

## 2019-11-18 NOTE — LETTER
November 18, 2019     Pasquale Reina DO  [address] [place] [address]    Patient: Yuna Balderas   YOB: 1955   Date of Visit: 11/18/2019       Dear Dr Rocael Mullins: Thank you for referring Ronnie Hdez to me for evaluation  Below are my notes for this consultation  If you have questions, please do not hesitate to call me  I look forward to following your patient along with you  Sincerely,        Shanna Persaud MD        CC: No Recipients  Shanna Persaud MD  11/18/2019  1:29 PM  Sign at close encounter  R Amanda [age] y o  @[facility] @ YOB: 1955 MRN: [id number]    Encounter: [id number] DATE: 11/18/2019    REASON FOR VISIT: Yuan Balderas is a 75 y o male returns to Nephrology office for further management of chronic kidney disease  HPI:    This is a 49-year-old male with retired  from [place] area with past medical history of hypertension, hypothyroidism, returns to the nephrology for further management of CKD stage 3  Upon review of old medical record patient's previously known baseline creatinine was between 1 8-2 0  Patient was also found to have 4 3 cm infrarenal aortic aneurysm and in the interim seen by vascular surgeon  According to patient no surgical intervention necessary at this point but has schedule appointment with vascular surgeon in the future  Patient has hypertension and in the interim patient has been checking blood pressure on regular basis and said that had blood pressure is under well controlled at this point  Previously patient was taking lisinopril but now taking combination of amlodipine/benazepril 5/40 mg PO daily and his blood pressure is currently under well control  Patient also has hypothyroidism which is under well controlled with the use of levothyroxine  Patient also takes lots of over-the-counter supplementation  Patient denies use of NSAIDs        REVIEW OF SYSTEMS:    Review of Systems   Constitutional: Negative for chills and fever  HENT: Negative for nosebleeds and sore throat  Eyes: Negative for photophobia and pain  Respiratory: Negative for choking and wheezing  Cardiovascular: Negative for chest pain and palpitations  Gastrointestinal: Negative for abdominal pain and blood in stool  Endocrine: Negative for cold intolerance and heat intolerance  Genitourinary: Negative for flank pain and hematuria  Musculoskeletal: Negative for joint swelling and neck pain  Skin: Negative for color change and pallor  Allergic/Immunologic: Negative for environmental allergies and immunocompromised state  Neurological: Negative for seizures and syncope  Hematological: Negative for adenopathy  Does not bruise/bleed easily  Psychiatric/Behavioral: Negative for confusion and suicidal ideas           PAST MEDICAL HISTORY:  Past Medical History:   Diagnosis Date    Aneurysm of infrarenal abdominal aorta ([address] )     Hypertension        PAST SURGICAL HISTORY:  Past Surgical History:   Procedure Laterality Date    LYMPH NODE DISSECTION         SOCIAL HISTORY:  Social History     Substance and Sexual Activity   Alcohol Use Not Currently     Social History     Substance and Sexual Activity   Drug Use Never     Social History     Tobacco Use   Smoking Status Former Smoker   Smokeless Tobacco Former User       FAMILY HISTORY:  Family History   Problem Relation Age of Onset    Heart disease Mother     Cancer Father     Heart disease Father     Diabetes Father     Cancer Sister     Breast cancer Sister     Cancer Brother     No Known Problems Maternal Aunt     Diabetes Sister        ALLERGY:  No Known Allergies    MEDICATIONS:    Current Outpatient Medications:     Ascorbic Acid (VITAMIN C) 100 MG tablet, Take 400 mg by mouth daily, Disp: , Rfl:     ascorbic acid (VITAMIN C) 250 mg tablet, Take 250 mg by mouth daily, Disp: , Rfl:     b complex vitamins capsule, Take 1 capsule by mouth daily, Disp: , Rfl:     Biotin 5 MG CAPS, Take 5 mg by mouth every other day , Disp: , Rfl:     cholecalciferol (VITAMIN D3) 1,000 units tablet, Take 1,000 Units by mouth daily, Disp: , Rfl:     co-enzyme Q-10 30 MG capsule, Take 30 mg by mouth daily , Disp: , Rfl:     Grape Seed 100 MG CAPS, Take by mouth, Disp: , Rfl:     levothyroxine 25 mcg tablet, Take 75 mcg by mouth daily , Disp: , Rfl: 3    Probiotic Product (PROBIOTIC-10 ULTIMATE PO), Take by mouth daily, Disp: , Rfl:     vitamin E 100 UNIT capsule, Take 100 Units by mouth daily, Disp: , Rfl:     zinc sulfate (ZINCATE) 220 mg capsule, Take 220 mg by mouth daily, Disp: , Rfl:     amLODIPine-benazepril (LOTREL) 5-40 MG per capsule, Take 1 capsule by mouth daily, Disp: 30 capsule, Rfl:     PHYSICAL EXAM:  Vitals:    11/18/19 1307   BP: 134/80   BP Location: Left arm   Patient Position: Sitting   Cuff Size: Standard   Pulse: 57   Resp: 16   Temp: 97 5 °F (36 4 °C)   TempSrc: Tympanic   Weight: 73 9 kg (163 lb)   Height: 5' 8" (1 727 m)     Body mass index is 24 78 kg/m²  Physical Exam   Constitutional: He appears well-nourished  No distress  HENT:   Head: Normocephalic and atraumatic  Eyes: No scleral icterus  Neck: Neck supple  No JVD present  Cardiovascular: Normal rate, S1 normal and S2 normal    Pulmonary/Chest: Effort normal  No accessory muscle usage  No respiratory distress  He has no wheezes  Abdominal: Soft  He exhibits no distension  Musculoskeletal: He exhibits no edema  Right ankle: He exhibits no swelling  Left ankle: He exhibits no swelling  Right hand: He exhibits no laceration  Left hand: He exhibits no laceration  Lymphadenopathy:        Right cervical: No superficial cervical adenopathy present  Left cervical: No superficial cervical adenopathy present  Neurological: He is alert  He is not disoriented  Skin: Skin is warm  No cyanosis  Psychiatric: He is not combative  He does not exhibit a depressed mood  He expresses no suicidal ideation         LAB RESULTS:  Results for orders placed or performed in visit on 11/15/19   CBC and differential   Result Value Ref Range    WBC 8 21 4 31 - 10 16 Thousand/uL    RBC 3 68 (L) 3 88 - 5 62 Million/uL    Hemoglobin 11 4 (L) 12 0 - 17 0 g/dL    Hematocrit 35 4 (L) 36 5 - 49 3 %    MCV 96 82 - 98 fL    MCH 31 0 26 8 - 34 3 pg    MCHC 32 2 31 4 - 37 4 g/dL    RDW 13 4 11 6 - 15 1 %    MPV 11 8 8 9 - 12 7 fL    Platelets 772 501 - 610 Thousands/uL    nRBC 0 /100 WBCs    Neutrophils Relative 61 43 - 75 %    Immat GRANS % 0 0 - 2 %    Lymphocytes Relative 30 14 - 44 %    Monocytes Relative 7 4 - 12 %    Eosinophils Relative 2 0 - 6 %    Basophils Relative 0 0 - 1 %    Neutrophils Absolute 4 90 1 85 - 7 62 Thousands/µL    Immature Grans Absolute 0 03 0 00 - 0 20 Thousand/uL    Lymphocytes Absolute 2 48 0 60 - 4 47 Thousands/µL    Monocytes Absolute 0 60 0 17 - 1 22 Thousand/µL    Eosinophils Absolute 0 17 0 00 - 0 61 Thousand/µL    Basophils Absolute 0 03 0 00 - 0 10 Thousands/µL   Basic metabolic panel   Result Value Ref Range    Sodium 138 136 - 145 mmol/L    Potassium 3 8 3 5 - 5 3 mmol/L    Chloride 103 100 - 108 mmol/L    CO2 26 21 - 32 mmol/L    ANION GAP 9 4 - 13 mmol/L    BUN 25 5 - 25 mg/dL    Creatinine 1 66 (H) 0 60 - 1 30 mg/dL    Glucose, Fasting 98 65 - 99 mg/dL    Calcium 8 6 8 3 - 10 1 mg/dL    eGFR 42 ml/min/1 73sq m   Urinalysis with microscopic   Result Value Ref Range    Clarity, UA Clear     Color, UA Yellow     Specific Gravity, UA <=1 005 1 003 - 1 030    pH, UA 6 0 4 5, 5 0, 5 5, 6 0, 6 5, 7 0, 7 5, 8 0    Glucose, UA Negative Negative mg/dl    Ketones, UA Negative Negative mg/dl    Blood, UA Negative Negative    Protein, UA Negative Negative mg/dl    Nitrite, UA Negative Negative    Bilirubin, UA Negative Negative    Urobilinogen, UA 0 2 0 2, 1 0 E U /dl E U /dl    Leukocytes, UA Negative Negative    WBC, UA None Seen None Seen, 0-5, 5-55, 5-65 /hpf    RBC, UA None Seen None Seen, 0-5 /hpf    Bacteria, UA None Seen None Seen, Occasional /hpf    Epithelial Cells None Seen None Seen, Occasional /hpf   Microalbumin / creatinine urine ratio   Result Value Ref Range    Creatinine, Ur 81 8 mg/dL    Microalbum  ,U,Random 47 9 (H) 0 0 - 20 0 mg/L    Microalb Creat Ratio 59 (H) 0 - 30 mg/g creatinine   Phosphorus   Result Value Ref Range    Phosphorus 4 3 (H) 2 3 - 4 1 mg/dL   Uric acid   Result Value Ref Range    Uric Acid 6 3 4 2 - 8 0 mg/dL   PTH, intact   Result Value Ref Range    PTH 48 0 18 4 - 80 1 pg/mL   Vitamin D 25 hydroxy   Result Value Ref Range    Vit D, 25-Hydroxy 47 8 30 0 - 100 0 ng/mL       Renal ultrasound done on 10/3/2018 showed bulge in left renal area with heterogenicity of 4 x 3 cm  CT scan of abdomen and pelvis without IV contrast on 11/9/2018 showed bubbles in left kidney area  4 3 cm infrarenal aortic aneurysm  ASSESSMENT and PLAN:  Diagnoses and all orders for this visit:    Stage 3 chronic kidney disease ([address] )  -     CBC and differential; Future  -     Basic metabolic panel; Future  -     Urinalysis with microscopic; Future  -     Microalbumin / creatinine urine ratio; Future  -     Phosphorus; Future  -     Uric acid; Future  -     PTH, intact; Future  -     Vitamin D 25 hydroxy; Future    Hypertensive kidney disease with stage 3 chronic kidney disease (HCC)  -     Basic metabolic panel; Future    Proteinuria, unspecified type  -     Urinalysis with microscopic; Future  -     Microalbumin / creatinine urine ratio; Future      1  CKD stage 3  Multifactorial and suspected due to underlying hypertensive nephrosclerosis  Upon review of old medical record patient's baseline creatinine seems to be fluctuating between 1 8-2 0 and in the interim renal function has improved to current creatinine of 1 66 with EGFR of 42      Currently hypertension is also under well control  Plan to avoid NSAIDs and recheck renal function before next visit  2  Hypertension in chronic kidney disease  Today's blood pressure was under well control and plan to monitor hypertension with amlodipine/benazepril 5/40 mg PO daily  Advised patient to follow low-salt diet  3  Proteinuria  Minimal, suspected due to underlying hypertensive nephrosclerosis  Current urine microalbumin to creatinine ratio is 59 mg which is better compared to previous check  Continue benazepril 40 mg PO daily  Patient is also currently following low protein diet  Return to Nephrology office in 6 months  Future labs ordered  Portions of the record may have been created with voice recognition software  Occasional wrong word or "sound a like" substitutions may have occurred due to the inherent limitations of voice recognition software  Read the chart carefully and recognize, using context, where substitutions have occurred  If you have any questions, please contact the dictating provider

## 2020-02-11 ENCOUNTER — TRANSCRIBE ORDERS (OUTPATIENT)
Dept: ADMINISTRATIVE | Facility: HOSPITAL | Age: 65
End: 2020-02-11

## 2020-02-11 ENCOUNTER — APPOINTMENT (OUTPATIENT)
Dept: LAB | Facility: HOSPITAL | Age: 65
End: 2020-02-11
Payer: MEDICARE

## 2020-02-11 DIAGNOSIS — E55.9 VITAMIN D DEFICIENCY: ICD-10-CM

## 2020-02-11 DIAGNOSIS — E55.9 VITAMIN D DEFICIENCY: Primary | ICD-10-CM

## 2020-02-11 DIAGNOSIS — E78.5 HYPERLIPIDEMIA, UNSPECIFIED HYPERLIPIDEMIA TYPE: ICD-10-CM

## 2020-02-11 DIAGNOSIS — E03.9 HYPOTHYROIDISM, UNSPECIFIED TYPE: ICD-10-CM

## 2020-02-11 DIAGNOSIS — E03.9 HYPOTHYROIDISM, UNSPECIFIED TYPE: Primary | ICD-10-CM

## 2020-02-11 LAB
25(OH)D3 SERPL-MCNC: 43.6 NG/ML (ref 30–100)
ALBUMIN SERPL BCP-MCNC: 3.6 G/DL (ref 3.5–5)
ALP SERPL-CCNC: 68 U/L (ref 46–116)
ALT SERPL W P-5'-P-CCNC: 11 U/L (ref 12–78)
ANION GAP SERPL CALCULATED.3IONS-SCNC: 9 MMOL/L (ref 4–13)
AST SERPL W P-5'-P-CCNC: 16 U/L (ref 5–45)
BILIRUB SERPL-MCNC: 0.5 MG/DL (ref 0.2–1)
BUN SERPL-MCNC: 24 MG/DL (ref 5–25)
CALCIUM SERPL-MCNC: 8.7 MG/DL (ref 8.3–10.1)
CHLORIDE SERPL-SCNC: 103 MMOL/L (ref 100–108)
CHOLEST SERPL-MCNC: 186 MG/DL (ref 50–200)
CO2 SERPL-SCNC: 27 MMOL/L (ref 21–32)
CREAT SERPL-MCNC: 1.59 MG/DL (ref 0.6–1.3)
FOLATE SERPL-MCNC: >20 NG/ML (ref 3.1–17.5)
GFR SERPL CREATININE-BSD FRML MDRD: 44 ML/MIN/1.73SQ M
GLUCOSE SERPL-MCNC: 78 MG/DL (ref 65–140)
HDLC SERPL-MCNC: 31 MG/DL
LDLC SERPL CALC-MCNC: 126 MG/DL (ref 0–100)
NONHDLC SERPL-MCNC: 155 MG/DL
POTASSIUM SERPL-SCNC: 4 MMOL/L (ref 3.5–5.3)
PROT SERPL-MCNC: 7.8 G/DL (ref 6.4–8.2)
SODIUM SERPL-SCNC: 139 MMOL/L (ref 136–145)
TRIGL SERPL-MCNC: 145 MG/DL
TSH SERPL DL<=0.05 MIU/L-ACNC: 16.55 UIU/ML (ref 0.36–3.74)
VIT B12 SERPL-MCNC: 1958 PG/ML (ref 100–900)

## 2020-02-11 PROCEDURE — 36415 COLL VENOUS BLD VENIPUNCTURE: CPT

## 2020-02-11 PROCEDURE — 80061 LIPID PANEL: CPT

## 2020-02-11 PROCEDURE — 82306 VITAMIN D 25 HYDROXY: CPT

## 2020-02-11 PROCEDURE — 82607 VITAMIN B-12: CPT

## 2020-02-11 PROCEDURE — 84443 ASSAY THYROID STIM HORMONE: CPT

## 2020-02-11 PROCEDURE — 80053 COMPREHEN METABOLIC PANEL: CPT

## 2020-02-11 PROCEDURE — 82746 ASSAY OF FOLIC ACID SERUM: CPT

## 2020-05-08 ENCOUNTER — TELEPHONE (OUTPATIENT)
Dept: NEPHROLOGY | Facility: CLINIC | Age: 65
End: 2020-05-08

## 2020-05-15 ENCOUNTER — TELEPHONE (OUTPATIENT)
Dept: NEPHROLOGY | Facility: CLINIC | Age: 65
End: 2020-05-15

## 2020-05-18 ENCOUNTER — TELEPHONE (OUTPATIENT)
Dept: NEPHROLOGY | Facility: CLINIC | Age: 65
End: 2020-05-18

## 2021-07-19 ENCOUNTER — APPOINTMENT (OUTPATIENT)
Dept: LAB | Facility: HOSPITAL | Age: 66
End: 2021-07-19
Payer: MEDICARE

## 2021-07-19 DIAGNOSIS — N18.30 STAGE 3 CHRONIC KIDNEY DISEASE, UNSPECIFIED WHETHER STAGE 3A OR 3B CKD (HCC): ICD-10-CM

## 2021-07-19 DIAGNOSIS — Z00.00 ROUTINE GENERAL MEDICAL EXAMINATION AT A HEALTH CARE FACILITY: ICD-10-CM

## 2021-07-19 DIAGNOSIS — E03.9 PRIMARY HYPOTHYROIDISM: ICD-10-CM

## 2021-07-19 LAB
25(OH)D3 SERPL-MCNC: 26.1 NG/ML (ref 30–100)
ALBUMIN SERPL BCP-MCNC: 3.8 G/DL (ref 3.5–5)
ALP SERPL-CCNC: 77 U/L (ref 46–116)
ALT SERPL W P-5'-P-CCNC: 27 U/L (ref 12–78)
ANION GAP SERPL CALCULATED.3IONS-SCNC: 12 MMOL/L (ref 4–13)
AST SERPL W P-5'-P-CCNC: 24 U/L (ref 5–45)
BASOPHILS # BLD AUTO: 0.03 THOUSANDS/ΜL (ref 0–0.1)
BASOPHILS NFR BLD AUTO: 0 % (ref 0–1)
BILIRUB SERPL-MCNC: 0.42 MG/DL (ref 0.2–1)
BUN SERPL-MCNC: 16 MG/DL (ref 5–25)
CALCIUM SERPL-MCNC: 8.9 MG/DL (ref 8.3–10.1)
CHLORIDE SERPL-SCNC: 105 MMOL/L (ref 100–108)
CHOLEST SERPL-MCNC: 202 MG/DL (ref 50–200)
CO2 SERPL-SCNC: 26 MMOL/L (ref 21–32)
CREAT SERPL-MCNC: 1.55 MG/DL (ref 0.6–1.3)
EOSINOPHIL # BLD AUTO: 0.16 THOUSAND/ΜL (ref 0–0.61)
EOSINOPHIL NFR BLD AUTO: 2 % (ref 0–6)
ERYTHROCYTE [DISTWIDTH] IN BLOOD BY AUTOMATED COUNT: 12.9 % (ref 11.6–15.1)
EST. AVERAGE GLUCOSE BLD GHB EST-MCNC: 123 MG/DL
GFR SERPL CREATININE-BSD FRML MDRD: 45 ML/MIN/1.73SQ M
GLUCOSE P FAST SERPL-MCNC: 105 MG/DL (ref 65–99)
HBA1C MFR BLD: 5.9 %
HCT VFR BLD AUTO: 43 % (ref 36.5–49.3)
HDLC SERPL-MCNC: 35 MG/DL
HGB BLD-MCNC: 14.2 G/DL (ref 12–17)
IMM GRANULOCYTES # BLD AUTO: 0.04 THOUSAND/UL (ref 0–0.2)
IMM GRANULOCYTES NFR BLD AUTO: 1 % (ref 0–2)
LDLC SERPL CALC-MCNC: 125 MG/DL (ref 0–100)
LYMPHOCYTES # BLD AUTO: 1.77 THOUSANDS/ΜL (ref 0.6–4.47)
LYMPHOCYTES NFR BLD AUTO: 21 % (ref 14–44)
MCH RBC QN AUTO: 30.1 PG (ref 26.8–34.3)
MCHC RBC AUTO-ENTMCNC: 33 G/DL (ref 31.4–37.4)
MCV RBC AUTO: 91 FL (ref 82–98)
MONOCYTES # BLD AUTO: 0.58 THOUSAND/ΜL (ref 0.17–1.22)
MONOCYTES NFR BLD AUTO: 7 % (ref 4–12)
NEUTROPHILS # BLD AUTO: 5.75 THOUSANDS/ΜL (ref 1.85–7.62)
NEUTS SEG NFR BLD AUTO: 69 % (ref 43–75)
NONHDLC SERPL-MCNC: 167 MG/DL
NRBC BLD AUTO-RTO: 0 /100 WBCS
PLATELET # BLD AUTO: 161 THOUSANDS/UL (ref 149–390)
PMV BLD AUTO: 12.4 FL (ref 8.9–12.7)
POTASSIUM SERPL-SCNC: 4.1 MMOL/L (ref 3.5–5.3)
PROT SERPL-MCNC: 7.7 G/DL (ref 6.4–8.2)
RBC # BLD AUTO: 4.71 MILLION/UL (ref 3.88–5.62)
SODIUM SERPL-SCNC: 143 MMOL/L (ref 136–145)
TRIGL SERPL-MCNC: 211 MG/DL
TSH SERPL DL<=0.05 MIU/L-ACNC: 0.35 UIU/ML (ref 0.36–3.74)
VIT B12 SERPL-MCNC: 729 PG/ML (ref 100–900)
WBC # BLD AUTO: 8.33 THOUSAND/UL (ref 4.31–10.16)

## 2021-07-19 PROCEDURE — 85025 COMPLETE CBC W/AUTO DIFF WBC: CPT

## 2021-07-19 PROCEDURE — 36415 COLL VENOUS BLD VENIPUNCTURE: CPT

## 2021-07-19 PROCEDURE — 82607 VITAMIN B-12: CPT

## 2021-07-19 PROCEDURE — 80053 COMPREHEN METABOLIC PANEL: CPT

## 2021-07-19 PROCEDURE — 84443 ASSAY THYROID STIM HORMONE: CPT

## 2021-07-19 PROCEDURE — 83036 HEMOGLOBIN GLYCOSYLATED A1C: CPT

## 2021-07-19 PROCEDURE — 82306 VITAMIN D 25 HYDROXY: CPT

## 2021-07-19 PROCEDURE — 80061 LIPID PANEL: CPT

## 2021-11-29 ENCOUNTER — APPOINTMENT (OUTPATIENT)
Dept: LAB | Facility: HOSPITAL | Age: 66
End: 2021-11-29
Payer: MEDICARE

## 2021-11-29 DIAGNOSIS — E78.5 HYPERLIPIDEMIA, UNSPECIFIED HYPERLIPIDEMIA TYPE: ICD-10-CM

## 2021-11-29 DIAGNOSIS — N18.30 STAGE 3 CHRONIC KIDNEY DISEASE, UNSPECIFIED WHETHER STAGE 3A OR 3B CKD (HCC): ICD-10-CM

## 2021-11-29 LAB
ALBUMIN SERPL BCP-MCNC: 4 G/DL (ref 3.5–5)
ALP SERPL-CCNC: 72 U/L (ref 46–116)
ALT SERPL W P-5'-P-CCNC: 23 U/L (ref 12–78)
ANION GAP SERPL CALCULATED.3IONS-SCNC: 9 MMOL/L (ref 4–13)
AST SERPL W P-5'-P-CCNC: 21 U/L (ref 5–45)
BASOPHILS # BLD AUTO: 0.03 THOUSANDS/ΜL (ref 0–0.1)
BASOPHILS NFR BLD AUTO: 0 % (ref 0–1)
BILIRUB SERPL-MCNC: 0.57 MG/DL (ref 0.2–1)
BUN SERPL-MCNC: 20 MG/DL (ref 5–25)
CALCIUM SERPL-MCNC: 8.7 MG/DL (ref 8.3–10.1)
CHLORIDE SERPL-SCNC: 107 MMOL/L (ref 100–108)
CHOLEST SERPL-MCNC: 228 MG/DL
CO2 SERPL-SCNC: 27 MMOL/L (ref 21–32)
CREAT SERPL-MCNC: 1.25 MG/DL (ref 0.6–1.3)
EOSINOPHIL # BLD AUTO: 0.11 THOUSAND/ΜL (ref 0–0.61)
EOSINOPHIL NFR BLD AUTO: 1 % (ref 0–6)
ERYTHROCYTE [DISTWIDTH] IN BLOOD BY AUTOMATED COUNT: 13 % (ref 11.6–15.1)
GFR SERPL CREATININE-BSD FRML MDRD: 59 ML/MIN/1.73SQ M
GLUCOSE P FAST SERPL-MCNC: 96 MG/DL (ref 65–99)
HCT VFR BLD AUTO: 44.8 % (ref 36.5–49.3)
HDLC SERPL-MCNC: 39 MG/DL
HGB BLD-MCNC: 15.1 G/DL (ref 12–17)
IMM GRANULOCYTES # BLD AUTO: 0.04 THOUSAND/UL (ref 0–0.2)
IMM GRANULOCYTES NFR BLD AUTO: 1 % (ref 0–2)
LDLC SERPL CALC-MCNC: 151 MG/DL (ref 0–100)
LYMPHOCYTES # BLD AUTO: 2.08 THOUSANDS/ΜL (ref 0.6–4.47)
LYMPHOCYTES NFR BLD AUTO: 24 % (ref 14–44)
MCH RBC QN AUTO: 30.9 PG (ref 26.8–34.3)
MCHC RBC AUTO-ENTMCNC: 33.7 G/DL (ref 31.4–37.4)
MCV RBC AUTO: 92 FL (ref 82–98)
MONOCYTES # BLD AUTO: 0.63 THOUSAND/ΜL (ref 0.17–1.22)
MONOCYTES NFR BLD AUTO: 7 % (ref 4–12)
NEUTROPHILS # BLD AUTO: 5.71 THOUSANDS/ΜL (ref 1.85–7.62)
NEUTS SEG NFR BLD AUTO: 67 % (ref 43–75)
NONHDLC SERPL-MCNC: 189 MG/DL
NRBC BLD AUTO-RTO: 0 /100 WBCS
PLATELET # BLD AUTO: 162 THOUSANDS/UL (ref 149–390)
PMV BLD AUTO: 12.3 FL (ref 8.9–12.7)
POTASSIUM SERPL-SCNC: 3.8 MMOL/L (ref 3.5–5.3)
PROT SERPL-MCNC: 7.5 G/DL (ref 6.4–8.2)
RBC # BLD AUTO: 4.88 MILLION/UL (ref 3.88–5.62)
SODIUM SERPL-SCNC: 143 MMOL/L (ref 136–145)
TRIGL SERPL-MCNC: 191 MG/DL
WBC # BLD AUTO: 8.6 THOUSAND/UL (ref 4.31–10.16)

## 2021-11-29 PROCEDURE — 80053 COMPREHEN METABOLIC PANEL: CPT

## 2021-11-29 PROCEDURE — 85025 COMPLETE CBC W/AUTO DIFF WBC: CPT

## 2021-11-29 PROCEDURE — 36415 COLL VENOUS BLD VENIPUNCTURE: CPT

## 2021-11-29 PROCEDURE — 80061 LIPID PANEL: CPT

## 2021-12-10 ENCOUNTER — APPOINTMENT (OUTPATIENT)
Dept: LAB | Facility: HOSPITAL | Age: 66
End: 2021-12-10
Payer: MEDICARE

## 2021-12-10 DIAGNOSIS — E03.9 MYXEDEMA HEART DISEASE: ICD-10-CM

## 2021-12-10 DIAGNOSIS — I51.9 MYXEDEMA HEART DISEASE: ICD-10-CM

## 2021-12-10 LAB — TSH SERPL DL<=0.05 MIU/L-ACNC: 0.18 UIU/ML (ref 0.36–3.74)

## 2021-12-10 PROCEDURE — 36415 COLL VENOUS BLD VENIPUNCTURE: CPT

## 2021-12-10 PROCEDURE — 84443 ASSAY THYROID STIM HORMONE: CPT

## 2022-03-02 ENCOUNTER — APPOINTMENT (OUTPATIENT)
Dept: LAB | Facility: HOSPITAL | Age: 67
End: 2022-03-02
Payer: MEDICARE

## 2022-03-02 DIAGNOSIS — I51.9 MYXEDEMA HEART DISEASE: ICD-10-CM

## 2022-03-02 DIAGNOSIS — E03.9 MYXEDEMA HEART DISEASE: ICD-10-CM

## 2022-03-02 LAB
T4 FREE SERPL-MCNC: 1.16 NG/DL (ref 0.76–1.46)
TSH SERPL DL<=0.05 MIU/L-ACNC: 0.42 UIU/ML (ref 0.36–3.74)

## 2022-03-02 PROCEDURE — 84443 ASSAY THYROID STIM HORMONE: CPT

## 2022-03-02 PROCEDURE — 84439 ASSAY OF FREE THYROXINE: CPT

## 2022-03-02 PROCEDURE — 36415 COLL VENOUS BLD VENIPUNCTURE: CPT

## 2022-08-05 ENCOUNTER — APPOINTMENT (OUTPATIENT)
Dept: LAB | Facility: HOSPITAL | Age: 67
End: 2022-08-05
Payer: MEDICARE

## 2022-08-05 DIAGNOSIS — N18.30 STAGE 3 CHRONIC KIDNEY DISEASE, UNSPECIFIED WHETHER STAGE 3A OR 3B CKD (HCC): ICD-10-CM

## 2022-08-05 DIAGNOSIS — E78.5 HYPERLIPIDEMIA, UNSPECIFIED HYPERLIPIDEMIA TYPE: ICD-10-CM

## 2022-08-05 LAB
ALBUMIN SERPL BCP-MCNC: 4 G/DL (ref 3.5–5)
ALP SERPL-CCNC: 65 U/L (ref 46–116)
ALT SERPL W P-5'-P-CCNC: 16 U/L (ref 12–78)
ANION GAP SERPL CALCULATED.3IONS-SCNC: 12 MMOL/L (ref 4–13)
AST SERPL W P-5'-P-CCNC: 21 U/L (ref 5–45)
BILIRUB SERPL-MCNC: 0.77 MG/DL (ref 0.2–1)
BUN SERPL-MCNC: 21 MG/DL (ref 5–25)
CALCIUM SERPL-MCNC: 8.8 MG/DL (ref 8.3–10.1)
CHLORIDE SERPL-SCNC: 104 MMOL/L (ref 96–108)
CHOLEST SERPL-MCNC: 248 MG/DL
CO2 SERPL-SCNC: 24 MMOL/L (ref 21–32)
CREAT SERPL-MCNC: 1.62 MG/DL (ref 0.6–1.3)
GFR SERPL CREATININE-BSD FRML MDRD: 42 ML/MIN/1.73SQ M
GLUCOSE P FAST SERPL-MCNC: 88 MG/DL (ref 65–99)
HDLC SERPL-MCNC: 39 MG/DL
LDLC SERPL CALC-MCNC: 177 MG/DL (ref 0–100)
NONHDLC SERPL-MCNC: 209 MG/DL
POTASSIUM SERPL-SCNC: 3.8 MMOL/L (ref 3.5–5.3)
PROT SERPL-MCNC: 7.6 G/DL (ref 6.4–8.4)
SODIUM SERPL-SCNC: 140 MMOL/L (ref 135–147)
TRIGL SERPL-MCNC: 162 MG/DL

## 2022-08-05 PROCEDURE — 36415 COLL VENOUS BLD VENIPUNCTURE: CPT

## 2022-08-05 PROCEDURE — 80061 LIPID PANEL: CPT

## 2022-08-05 PROCEDURE — 80053 COMPREHEN METABOLIC PANEL: CPT

## 2022-12-08 ENCOUNTER — TELEPHONE (OUTPATIENT)
Dept: ENDOCRINOLOGY | Age: 67
End: 2022-12-08

## 2022-12-08 DIAGNOSIS — E03.9 ACQUIRED HYPOTHYROIDISM: Primary | ICD-10-CM

## 2022-12-08 RX ORDER — LEVOTHYROXINE SODIUM 0.03 MG/1
88 TABLET ORAL DAILY
Qty: 90 TABLET | Refills: 0 | Status: SHIPPED | OUTPATIENT
Start: 2022-12-08 | End: 2022-12-13

## 2022-12-08 RX ORDER — EZETIMIBE 10 MG/1
10 TABLET ORAL DAILY
COMMUNITY
Start: 2022-06-01 | End: 2023-06-01

## 2022-12-08 RX ORDER — CHOLECALCIFEROL (VITAMIN D3) 125 MCG
1 CAPSULE ORAL
COMMUNITY

## 2022-12-13 DIAGNOSIS — E03.9 HYPOTHYROIDISM, UNSPECIFIED TYPE: Primary | ICD-10-CM

## 2022-12-13 RX ORDER — LEVOTHYROXINE SODIUM 88 UG/1
88 CAPSULE ORAL DAILY
COMMUNITY
End: 2022-12-13

## 2022-12-13 RX ORDER — LEVOTHYROXINE SODIUM 88 UG/1
88 CAPSULE ORAL DAILY
Qty: 90 CAPSULE | Refills: 2 | Status: SHIPPED | OUTPATIENT
Start: 2022-12-13

## 2023-03-07 DIAGNOSIS — E03.9 HYPOTHYROIDISM, UNSPECIFIED TYPE: Primary | ICD-10-CM

## 2023-03-07 RX ORDER — LEVOTHYROXINE SODIUM 88 UG/1
88 TABLET ORAL DAILY
COMMUNITY
Start: 2022-12-02 | End: 2023-03-07 | Stop reason: SDUPTHER

## 2023-03-07 RX ORDER — LEVOTHYROXINE SODIUM 88 UG/1
88 TABLET ORAL DAILY
Qty: 90 TABLET | Refills: 2 | Status: SHIPPED | OUTPATIENT
Start: 2023-03-07

## 2023-03-08 ENCOUNTER — TELEPHONE (OUTPATIENT)
Dept: ENDOCRINOLOGY | Facility: CLINIC | Age: 68
End: 2023-03-08

## 2023-03-08 NOTE — TELEPHONE ENCOUNTER
Pt l/m requesting a call back from Dr Krissy Olsen office  He states he has tried calling them directly and no one picks up the phone or returns any of his calls  Pt can be reached @ [phone]

## 2023-03-08 NOTE — TELEPHONE ENCOUNTER
Spoke to patient  He needed a refill for Levothyroxine  I explained that since he has not been seen is such a long time, he would have to request the medication from his PCP    However, we were able to get him on the schedule for June and on the wait list

## 2023-06-07 DIAGNOSIS — E11.65 INADEQUATELY CONTROLLED DIABETES MELLITUS (HCC): ICD-10-CM

## 2023-06-07 DIAGNOSIS — R73.9 HYPERGLYCEMIA, UNSPECIFIED: ICD-10-CM

## 2023-06-07 DIAGNOSIS — E03.9 HYPOTHYROIDISM, UNSPECIFIED TYPE: Primary | ICD-10-CM

## 2023-06-09 ENCOUNTER — APPOINTMENT (OUTPATIENT)
Dept: LAB | Facility: HOSPITAL | Age: 68
End: 2023-06-09
Payer: MEDICARE

## 2023-06-09 DIAGNOSIS — R73.9 HYPERGLYCEMIA, UNSPECIFIED: ICD-10-CM

## 2023-06-09 DIAGNOSIS — E03.9 HYPOTHYROIDISM, UNSPECIFIED TYPE: ICD-10-CM

## 2023-06-09 LAB — TSH SERPL DL<=0.05 MIU/L-ACNC: 0.34 UIU/ML (ref 0.45–4.5)

## 2023-06-09 PROCEDURE — 83036 HEMOGLOBIN GLYCOSYLATED A1C: CPT

## 2023-06-09 PROCEDURE — 36415 COLL VENOUS BLD VENIPUNCTURE: CPT

## 2023-06-09 PROCEDURE — 84443 ASSAY THYROID STIM HORMONE: CPT

## 2023-06-10 LAB
EST. AVERAGE GLUCOSE BLD GHB EST-MCNC: 114 MG/DL
HBA1C MFR BLD: 5.6 %

## 2023-06-12 ENCOUNTER — OFFICE VISIT (OUTPATIENT)
Age: 68
End: 2023-06-12
Payer: MEDICARE

## 2023-06-12 VITALS
OXYGEN SATURATION: 96 % | HEART RATE: 70 BPM | SYSTOLIC BLOOD PRESSURE: 110 MMHG | DIASTOLIC BLOOD PRESSURE: 68 MMHG | BODY MASS INDEX: 24.71 KG/M2 | WEIGHT: 163 LBS | HEIGHT: 68 IN | TEMPERATURE: 98.7 F

## 2023-06-12 DIAGNOSIS — E03.9 HYPOTHYROIDISM, UNSPECIFIED TYPE: Primary | ICD-10-CM

## 2023-06-12 DIAGNOSIS — N18.31 STAGE 3A CHRONIC KIDNEY DISEASE (HCC): ICD-10-CM

## 2023-06-12 PROCEDURE — 99214 OFFICE O/P EST MOD 30 MIN: CPT | Performed by: INTERNAL MEDICINE

## 2023-06-12 NOTE — PROGRESS NOTES
" Saad Gurrola 77 y o  male MRN: [id number]    Encounter: [id number]      Assessment/Plan     Assessment: This is a 77y o -year-old male for follow upon    1-primary hypothyroidism: in past one week he has increased his L-T4 to 110 mcg/d ( 1-1/4 tab) because he felt he needs more  His TSH is 0 34 / His exam is unremarkable with no goiter or any signs of being under or over supplemented  I advised him to stay on 88 mcg/d and have his TSH checked in few weeks  2-pre DM: his A1c is 5 6%  He has been dieting and cut back on his \"sugar\" intake  3-HTN and CKD III: he has topped seeing Dr Leyda Ravi  Plan:  L-T4 88 mcg/d  TSH in 2 months  He can be followed up by his PCP if he wishes  CC:   Primary hypothyroidism    History of Present Illness     HPI:  Primary hypothyroidism/ HTN, dyslipidemia, CKD    Review of Systems   Constitutional: Negative for diaphoresis and fatigue  Eyes: Negative for photophobia and redness  Respiratory: Negative for choking  Cardiovascular: Negative for palpitations  Gastrointestinal: Negative for constipation and diarrhea  Endocrine: Negative for cold intolerance and heat intolerance  Genitourinary: Negative for difficulty urinating  Musculoskeletal: Negative for arthralgias  Neurological: Negative for tremors and weakness  All other systems reviewed and are negative        Historical Information   Past Medical History:   Diagnosis Date   • Aneurysm of infrarenal abdominal aorta (HCC)    • Dyslipidemia    • Hip fracture ([facility] [address] ) 1995    left   • Hypertension    • Hypothyroidism      Past Surgical History:   Procedure Laterality Date   • LYMPH NODE DISSECTION       Social History   Social History     Substance and Sexual Activity   Alcohol Use Not Currently     Social History     Substance and Sexual Activity   Drug Use Never     Social History     Tobacco Use   Smoking Status Former   Smokeless Tobacco Former     Family History:   Family History   Problem Relation " "Age of Onset   • Heart disease Mother    • Cancer Father    • Heart disease Father    • Diabetes Father    • Cancer Sister    • Breast cancer Sister    • Diabetes Sister    • Diabetes Brother    • Cancer Brother    • No Known Problems Maternal Aunt        Meds/Allergies   Current Outpatient Medications   Medication Sig Dispense Refill   • amLODIPine-benazepril (LOTREL) 5-40 MG per capsule Take 1 capsule by mouth daily 30 capsule    • b complex vitamins capsule Take 1 capsule by mouth daily     • Biotin 5 MG CAPS Take 5 mg by mouth every other day      • cholecalciferol (VITAMIN D3) 1,000 units tablet Take 1,000 Units by mouth daily     • co-enzyme Q-10 30 MG capsule Take 30 mg by mouth daily      • Garlic 861 MG CAPS Take by mouth     • levothyroxine 88 mcg tablet Take 1 tablet (88 mcg total) by mouth daily 90 tablet 2   • Probiotic Product (PROBIOTIC-10 ULTIMATE PO) Take by mouth daily     • vitamin B-12 (VITAMIN B-12) 500 mcg tablet Take 1 capsule by mouth     • zinc sulfate (ZINCATE) 220 mg capsule Take 220 mg by mouth daily     • Ascorbic Acid (VITAMIN C) 100 MG tablet Take 400 mg by mouth daily (Patient not taking: Reported on 6/12/2023)     • ascorbic acid (VITAMIN C) 250 mg tablet Take 250 mg by mouth daily (Patient not taking: Reported on 6/12/2023)     • ezetimibe (ZETIA) 10 mg tablet Take 10 mg by mouth daily (Patient not taking: Reported on 6/12/2023)     • Grape Seed 100 MG CAPS Take by mouth (Patient not taking: Reported on 6/12/2023)     • vitamin E 100 UNIT capsule Take 100 Units by mouth daily (Patient not taking: Reported on 6/12/2023)       No current facility-administered medications for this visit  No Known Allergies    Objective   Vitals: Blood pressure 110/68, pulse 70, temperature 98 7 °F (37 1 °C), temperature source Temporal, height 5' 8\" (1 727 m), weight 73 9 kg (163 lb), SpO2 96 %  Physical Exam  Constitutional:       Appearance: He is ill-appearing     HENT:      Head: " "Normocephalic  Mouth/Throat:      Mouth: Mucous membranes are moist    Eyes:      Extraocular Movements: Extraocular movements intact  Neck:      Thyroid: No thyromegaly  Cardiovascular:      Rate and Rhythm: Normal rate and regular rhythm  Heart sounds: Normal heart sounds  No murmur heard  Pulmonary:      Breath sounds: No wheezing or rales  Abdominal:      Palpations: Abdomen is soft  Tenderness: There is no abdominal tenderness  Musculoskeletal:      Right lower leg: No edema  Left lower leg: No edema  Lymphadenopathy:      Cervical: No cervical adenopathy  Skin:     Findings: No rash  Neurological:      General: No focal deficit present  Mental Status: He is oriented to person, place, and time  Cranial Nerves: No cranial nerve deficit  The history was obtained from the review of the chart, patient  Lab Results:   Lab Results   Component Value Date/Time    TSH 3RD GENERATON 0 340 (L) 06/09/2023 01:23 PM       Imaging Studies:   Results for orders placed during the hospital encounter of 10/03/18    US thyroid    Impression  Heterogeneous and hypervascular thyroid gland, which can be seen in the setting of Graves' disease  No discrete thyroid nodules identified  Workstation performed: [id number]      I have personally reviewed pertinent reports  Portions of the record may have been created with voice recognition software  Occasional wrong word or \"sound a like\" substitutions may have occurred due to the inherent limitations of voice recognition software  Read the chart carefully and recognize, using context, where substitutions have occurred    "

## 2023-08-14 ENCOUNTER — APPOINTMENT (OUTPATIENT)
Dept: LAB | Facility: HOSPITAL | Age: 68
End: 2023-08-14
Payer: MEDICARE

## 2023-08-14 DIAGNOSIS — E03.9 HYPOTHYROIDISM, UNSPECIFIED TYPE: ICD-10-CM

## 2023-08-14 LAB — TSH SERPL DL<=0.05 MIU/L-ACNC: 2.47 UIU/ML (ref 0.45–4.5)

## 2023-08-14 PROCEDURE — 84443 ASSAY THYROID STIM HORMONE: CPT

## 2023-08-14 PROCEDURE — 36415 COLL VENOUS BLD VENIPUNCTURE: CPT

## 2023-12-01 DIAGNOSIS — E03.9 HYPOTHYROIDISM, UNSPECIFIED TYPE: ICD-10-CM

## 2023-12-05 RX ORDER — LEVOTHYROXINE SODIUM 88 UG/1
88 TABLET ORAL DAILY
Qty: 90 TABLET | Refills: 2 | Status: SHIPPED | OUTPATIENT
Start: 2023-12-05

## 2024-08-25 DIAGNOSIS — E03.9 HYPOTHYROIDISM, UNSPECIFIED TYPE: ICD-10-CM

## 2024-08-26 NOTE — PROGRESS NOTES
Ambulatory Visit  Name: Gaston Lobo      : 1955      MRN: [id number]  Encounter Provider: NICOL Parra  Encounter Date: 2024   Encounter department: [facility]    Assessment & Plan   1. Hypothyroidism due to Hashimoto's thyroiditis  Assessment & Plan:  No recent thyroid tests.  Denies taking biotin for more than a week.  Repeat thyroid function tests today and await results prior to making dose adjustments.  At this time, continue levothyroxine 88 mcg daily.    Will repeat thyroid ultrasound for right sided fullness.  Orders:  -     T4, free  -     TSH, 3rd generation  -     levothyroxine 88 mcg tablet; Take 1 tablet (88 mcg total) by mouth daily  -     US thyroid  2. Hyperglycemia, unspecified  Assessment & Plan:  Elevated blood glucose levels on previous CMP.  Will check A1c level, last level revealed prediabetes.  Not currently on medication for elevated blood glucose levels.  Orders:  -     Hemoglobin A1C  -     Basic metabolic panel      History of Present Illness     Gaston Lobo is a 69 y.o. male who presents to the office today for follow-up of primary hypothyroidism.  In 2018, he was found to have TSH elevated over 100 and positive thyroid microsomal antibody and thyroglobulin antibody.  He was last seen in the office 2023 by Dr. Chao at which time he was taking levothyroxine 88 mcg daily.  He offers no complaints today.    Current Medication Regimen:   Levothyroxine 88 mcg daily    Review of Systems   Constitutional:  Negative for appetite change, fatigue and unexpected weight change.   HENT:  Negative for congestion and sore throat.    Respiratory:  Negative for cough.    Cardiovascular:  Positive for palpitations. Negative for chest pain and leg swelling.   Gastrointestinal:  Negative for abdominal pain, constipation, diarrhea, nausea and vomiting.   Endocrine: Negative for cold intolerance, heat  intolerance, polydipsia and polyuria.   Musculoskeletal:  Positive for back pain. Negative for arthralgias.   Skin:  Negative for wound.   Neurological:  Negative for tremors and weakness.   Psychiatric/Behavioral:  Positive for sleep disturbance.      Current Outpatient Medications on File Prior to Visit   Medication Sig Dispense Refill    amLODIPine-benazepril (LOTREL) 5-40 MG per capsule Take 1 capsule by mouth daily 30 capsule     b complex vitamins capsule Take 1 capsule by mouth daily      Biotin 5 MG CAPS Take 5 mg by mouth every other day       cholecalciferol (VITAMIN D3) 1,000 units tablet Take 1,000 Units by mouth daily      co-enzyme Q-10 30 MG capsule Take 30 mg by mouth daily       magnesium 30 MG tablet Take 30 mg by mouth 2 (two) times a day      Probiotic Product (PROBIOTIC-10 ULTIMATE PO) Take by mouth daily      vitamin B-12 (VITAMIN B-12) 500 mcg tablet Take 1 capsule by mouth Every other      [DISCONTINUED] levothyroxine 88 mcg tablet TAKE 1 TABLET BY MOUTH EVERY DAY 90 tablet 2    ezetimibe (ZETIA) 10 mg tablet Take 10 mg by mouth daily (Patient not taking: Reported on 6/12/2023)      [DISCONTINUED] Ascorbic Acid (VITAMIN C) 100 MG tablet Take 400 mg by mouth daily (Patient not taking: Reported on 6/12/2023)      [DISCONTINUED] ascorbic acid (VITAMIN C) 250 mg tablet Take 250 mg by mouth daily (Patient not taking: Reported on 6/12/2023)      [DISCONTINUED] Garlic 300 MG CAPS Take by mouth (Patient not taking: Reported on 8/27/2024)      [DISCONTINUED] Grape Seed 100 MG CAPS Take by mouth (Patient not taking: Reported on 6/12/2023)      [DISCONTINUED] vitamin E 100 UNIT capsule Take 100 Units by mouth daily (Patient not taking: Reported on 6/12/2023)      [DISCONTINUED] zinc sulfate (ZINCATE) 220 mg capsule Take 220 mg by mouth daily (Patient not taking: Reported on 8/27/2024)       No current facility-administered medications on file prior to visit.      Social History     Tobacco Use     "Smoking status: Former    Smokeless tobacco: Former   Substance and Sexual Activity    Alcohol use: Not Currently    Drug use: Never    Sexual activity: Not on file     Objective     /80 (BP Location: Right arm, Patient Position: Sitting, Cuff Size: Standard)   Pulse 65   Temp 98.1 °F (36.7 °C)   Ht 5' 8\" (1.727 m)   Wt 73.3 kg (161 lb 9.6 oz)   SpO2 96%   BMI 24.57 kg/m²     Physical Exam  Vitals reviewed.   Constitutional:       General: He is not in acute distress.     Appearance: He is well-developed.   HENT:      Head: Normocephalic and atraumatic.      Mouth/Throat:      Mouth: Mucous membranes are moist.   Eyes:      Conjunctiva/sclera: Conjunctivae normal.   Neck:      Thyroid: Thyromegaly present. No thyroid tenderness.   Cardiovascular:      Rate and Rhythm: Normal rate.   Pulmonary:      Effort: Pulmonary effort is normal. No respiratory distress.   Abdominal:      General: There is no distension.      Palpations: Abdomen is soft.      Tenderness: There is no abdominal tenderness.   Musculoskeletal:         General: No swelling.      Cervical back: Normal range of motion.   Skin:     General: Skin is warm and dry.   Neurological:      Mental Status: He is alert and oriented to person, place, and time.   Psychiatric:         Mood and Affect: Mood normal.         Behavior: Behavior normal.         Thought Content: Thought content normal.       Administrative Statements   I have spent a total time of 35 minutes in caring for this patient on the day of the visit/encounter including Diagnostic results, Prognosis, Risks and benefits of tx options, Instructions for management, Patient and family education, Importance of tx compliance, Risk factor reductions, Impressions, Counseling / Coordination of care, Documenting in the medical record, Reviewing / ordering tests, medicine, procedures  , and Obtaining or reviewing history  .    "

## 2024-08-27 ENCOUNTER — APPOINTMENT (OUTPATIENT)
Age: 69
End: 2024-08-27
Payer: MEDICARE

## 2024-08-27 ENCOUNTER — OFFICE VISIT (OUTPATIENT)
Age: 69
End: 2024-08-27
Payer: MEDICARE

## 2024-08-27 VITALS
OXYGEN SATURATION: 96 % | HEART RATE: 65 BPM | DIASTOLIC BLOOD PRESSURE: 80 MMHG | WEIGHT: 161.6 LBS | HEIGHT: 68 IN | TEMPERATURE: 98.1 F | SYSTOLIC BLOOD PRESSURE: 120 MMHG | BODY MASS INDEX: 24.49 KG/M2

## 2024-08-27 DIAGNOSIS — E03.8 HYPOTHYROIDISM DUE TO HASHIMOTO'S THYROIDITIS: Primary | ICD-10-CM

## 2024-08-27 DIAGNOSIS — R73.9 HYPERGLYCEMIA, UNSPECIFIED: ICD-10-CM

## 2024-08-27 DIAGNOSIS — E06.3 HYPOTHYROIDISM DUE TO HASHIMOTO'S THYROIDITIS: Primary | ICD-10-CM

## 2024-08-27 LAB
EST. AVERAGE GLUCOSE BLD GHB EST-MCNC: 123 MG/DL
HBA1C MFR BLD: 5.9 %
T4 FREE SERPL-MCNC: 0.82 NG/DL (ref 0.61–1.12)
TSH SERPL DL<=0.05 MIU/L-ACNC: 1.62 UIU/ML (ref 0.45–4.5)

## 2024-08-27 PROCEDURE — 80048 BASIC METABOLIC PNL TOTAL CA: CPT

## 2024-08-27 PROCEDURE — 84443 ASSAY THYROID STIM HORMONE: CPT

## 2024-08-27 PROCEDURE — 84439 ASSAY OF FREE THYROXINE: CPT

## 2024-08-27 PROCEDURE — 36415 COLL VENOUS BLD VENIPUNCTURE: CPT

## 2024-08-27 PROCEDURE — 83036 HEMOGLOBIN GLYCOSYLATED A1C: CPT

## 2024-08-27 PROCEDURE — 99214 OFFICE O/P EST MOD 30 MIN: CPT

## 2024-08-27 RX ORDER — LEVOTHYROXINE SODIUM 88 UG/1
88 TABLET ORAL DAILY
Qty: 14 TABLET | Refills: 0 | Status: SHIPPED | OUTPATIENT
Start: 2024-08-27 | End: 2024-08-28 | Stop reason: SDUPTHER

## 2024-08-27 RX ORDER — LEVOTHYROXINE SODIUM 88 UG/1
88 TABLET ORAL DAILY
Qty: 90 TABLET | Refills: 2 | OUTPATIENT
Start: 2024-08-27

## 2024-08-27 RX ORDER — MAGNESIUM 30 MG
30 TABLET ORAL 2 TIMES DAILY
COMMUNITY

## 2024-08-27 NOTE — ASSESSMENT & PLAN NOTE
Presents clinically euthyroid.  No recent thyroid tests.  Denies taking biotin for more than a week.  Repeat thyroid function tests today and await results prior to making dose adjustments.  At this time, continue levothyroxine 88 mcg daily.    Will repeat thyroid ultrasound for right sided fullness.

## 2024-08-27 NOTE — ASSESSMENT & PLAN NOTE
Elevated blood glucose levels on previous CMP.  Will check A1c level, last level revealed prediabetes.  Not currently on medication for elevated blood glucose levels.

## 2024-08-28 DIAGNOSIS — E03.8 HYPOTHYROIDISM DUE TO HASHIMOTO'S THYROIDITIS: ICD-10-CM

## 2024-08-28 DIAGNOSIS — E06.3 HYPOTHYROIDISM DUE TO HASHIMOTO'S THYROIDITIS: ICD-10-CM

## 2024-08-28 LAB
ANION GAP SERPL CALCULATED.3IONS-SCNC: 8 MMOL/L (ref 4–13)
BUN SERPL-MCNC: 18 MG/DL (ref 5–25)
CALCIUM SERPL-MCNC: 10.2 MG/DL (ref 8.4–10.2)
CHLORIDE SERPL-SCNC: 103 MMOL/L (ref 96–108)
CO2 SERPL-SCNC: 28 MMOL/L (ref 21–32)
CREAT SERPL-MCNC: 1.47 MG/DL (ref 0.6–1.3)
GFR SERPL CREATININE-BSD FRML MDRD: 47 ML/MIN/1.73SQ M
GLUCOSE SERPL-MCNC: 80 MG/DL (ref 65–140)
POTASSIUM SERPL-SCNC: 5 MMOL/L (ref 3.5–5.3)
SODIUM SERPL-SCNC: 139 MMOL/L (ref 135–147)

## 2024-08-28 RX ORDER — LEVOTHYROXINE SODIUM 88 UG/1
88 TABLET ORAL DAILY
Qty: 100 TABLET | Refills: 1 | Status: SHIPPED | OUTPATIENT
Start: 2024-08-28

## 2024-08-29 ENCOUNTER — TELEPHONE (OUTPATIENT)
Age: 69
End: 2024-08-29

## 2024-08-29 NOTE — TELEPHONE ENCOUNTER
[facility].  Continue with 88mcg once daily  Repeat labs before follow up appt in December.

## 2024-08-29 NOTE — TELEPHONE ENCOUNTER
----- Message from NICOL Mercado sent at 8/28/2024  7:34 AM EDT -----  Labs reviewed.  Nonurgent results.  A1c reveals prediabetes-continue diet and lifestyle modification.  TSH and free T4 are within expected range-continue levothyroxine 88 mcg daily.  Levothyroxine sent to pharmacy.  Repeat labs prior to next visit in December.

## 2024-10-06 ENCOUNTER — HOSPITAL ENCOUNTER (OUTPATIENT)
Dept: ULTRASOUND IMAGING | Facility: HOSPITAL | Age: 69
Discharge: HOME | End: 2024-10-06
Payer: MEDICARE

## 2024-10-06 PROCEDURE — 76536 US EXAM OF HEAD AND NECK: CPT

## 2024-10-08 ENCOUNTER — TELEPHONE (OUTPATIENT)
Age: 69
End: 2024-10-08

## 2024-10-08 NOTE — TELEPHONE ENCOUNTER
LVM for patient to return call for US results.   Thyroid ultrasound reviewed. No nodules demonstrated. To be discussed further at next appointment.

## 2024-10-08 NOTE — TELEPHONE ENCOUNTER
Patient was updated on his ultrasound results. He said he developed a lump on his neck after the ultrasound. He said it is the size of a marble and its not painful. He said it moves around if he touches it.

## 2024-10-08 NOTE — TELEPHONE ENCOUNTER
----- Message from NICOL Mercado sent at 10/8/2024  2:24 PM EDT -----  Thyroid ultrasound reviewed.  No nodules demonstrated.  To be discussed further at next appointment.

## 2024-10-09 NOTE — TELEPHONE ENCOUNTER
"LVM for patient - relayed Celena's message. \"Developing a movable mass is not a common occurrence following thyroid ultrasound. It is likely unrelated.  He should make appointment with his primary care provider for further evaluation\"    Please call the office if you have any additional questions.   "

## 2024-12-20 NOTE — PROGRESS NOTES
Name: Gaston Lobo      : 1955      MRN: [id number]  Encounter Provider: NICOL Parra  Encounter Date: 2024   Encounter department: [facility]  :  Assessment & Plan  Hypothyroidism due to Hashimoto's thyroiditis  Overdue for thyroid function tests.     Presents clinically and biochemically euthyroid.    Reviewed thyroid ultrasound completed 10/6/2024 which demonstrated heterogeneous thyroid gland without nodules.    Continue Levothyroxine 88 mcg and repeat thyroid labs today. Await receipt of thyroid labs prior to making dose adjustments if necessary.  Orders:  •  T4, free; Future  •  TSH, 3rd generation; Future    Prediabetes  Will check A1c prior to next visit.  Continue diet and lifestyle modification including attention to the amount and type of carbohydrates consumed as well as increased physical activity as tolerated.  Orders:  •  Hemoglobin A1C; Future    Stage 3a chronic kidney disease (HCC)  Follows with kidney care specialist.  Reports he has been following kidney friendly diet.  Continue to maintain good glycemic control.  Lab Results   Component Value Date    EGFR 56 (L) 2024    EGFR 54 (L) 2024    EGFR 47 2024    CREATININE 1.35 (H) 2024    CREATININE 1.40 (H) 2024    CREATININE 1.47 (H) 2024        Mixed hyperlipidemia  Well overdue for lipid panel-reports he has completed recently.  Reports he has stopped his Zetia 10 mg daily due to the belief it is no longer required.  Encouraged to reach out to primary care provider for clarification on necessity of Zetia.           History of Present Illness {?Quick Links Encounters * My Last Note * Last Note in Specialty * Snapshot * Since Last Visit * History :23390}  HPI  Gaston Lobo is a 69 y.o. male who presents to the office today for routine follow-up of hypothyroidism secondary to Hashimoto's thyroiditis and prediabetes.  In 2018,  he was found to have TSH elevated over 100 and positive thyroid microsomal antibody and thyroglobulin antibody.  He was last seen in the office 8/27/2024 at which time levothyroxine 88 mcg daily was continued.  His A1c was 5.9% and he was advised to continue diet and lifestyle modification.  He was sent for thyroid ultrasound which revealed no discrete nodules.  The week following his exam, he reported feeling palpable masses in his neck and his primary care provider ordered ultrasound head neck soft tissue which revealed two well-circumscribed hypoechoic masses predominantly cystic in the area of his upper neck.  These aforementioned cysts have since disappeared.  He has not completed his labs prior to today's visit    Current Medication Regimen:   Levothyroxine 88 mcg daily    No history of external radiation to head/neck/chest.  No recent iodine loading in form of medication, biotin or kelp supplements, or radiological diagnostic studies.      Family history of thyroid disease includes: Denies  Family history of thyroid cancer includes: Denies      History obtained from: patient    Review of Systems   Constitutional:  Negative for appetite change, fatigue and unexpected weight change.   HENT:  Negative for sore throat, trouble swallowing and voice change.    Respiratory:  Negative for cough.    Cardiovascular:  Positive for palpitations. Negative for chest pain.   Gastrointestinal:  Negative for constipation and diarrhea.   Endocrine: Negative for cold intolerance and heat intolerance.   Musculoskeletal:  Negative for myalgias.   Neurological:  Negative for dizziness and weakness.   Psychiatric/Behavioral:  Positive for sleep disturbance.      Current Outpatient Medications on File Prior to Visit   Medication Sig Dispense Refill   • amLODIPine-benazepril (LOTREL) 5-40 MG per capsule Take 1 capsule by mouth daily 30 capsule    • b complex vitamins capsule Take 1 capsule by mouth daily     • Biotin 5 MG CAPS Take 5  "mg by mouth every other day      • cholecalciferol (VITAMIN D3) 1,000 units tablet Take 1,000 Units by mouth daily     • co-enzyme Q-10 30 MG capsule Take 30 mg by mouth daily      • levothyroxine 88 mcg tablet Take 1 tablet (88 mcg total) by mouth daily 100 tablet 1   • magnesium 30 MG tablet Take 30 mg by mouth 2 (two) times a day     • Probiotic Product (PROBIOTIC-10 ULTIMATE PO) Take by mouth daily     • vitamin B-12 (VITAMIN B-12) 500 mcg tablet Take 1 capsule by mouth Every other     • ezetimibe (ZETIA) 10 mg tablet Take 10 mg by mouth daily (Patient not taking: Reported on 12/23/2024)       No current facility-administered medications on file prior to visit.      Social History     Tobacco Use   • Smoking status: Former   • Smokeless tobacco: Former   Substance and Sexual Activity   • Alcohol use: Not Currently   • Drug use: Never   • Sexual activity: Not on file        Objective {?Quick Links Trend Vitals * Enter New Vitals * Results Review * Timeline (Adult) * Labs * Imaging * Cardiology * Procedures * Lung Cancer Screening * Surgical eConsent :68736}  /62 (BP Location: Right arm, Patient Position: Sitting, Cuff Size: Standard)   Pulse 68   Temp 98 °F (36.7 °C)   Ht 5' 8\" (1.727 m)   Wt 70.3 kg (155 lb)   SpO2 97%   BMI 23.57 kg/m²      Physical Exam  Vitals reviewed.   Constitutional:       General: He is not in acute distress.     Appearance: Normal appearance. He is well-groomed and normal weight.   HENT:      Head: Normocephalic and atraumatic.      Mouth/Throat:      Mouth: Mucous membranes are moist.   Eyes:      Conjunctiva/sclera: Conjunctivae normal.   Neck:      Thyroid: No thyroid mass, thyromegaly or thyroid tenderness.   Cardiovascular:      Rate and Rhythm: Normal rate.   Pulmonary:      Effort: Pulmonary effort is normal. No respiratory distress.   Abdominal:      General: There is no distension.      Palpations: Abdomen is soft.      Tenderness: There is no abdominal tenderness. "   Musculoskeletal:         General: No swelling.      Cervical back: Normal range of motion.   Skin:     General: Skin is warm and dry.   Neurological:      Mental Status: He is alert and oriented to person, place, and time.   Psychiatric:         Mood and Affect: Mood normal.         Behavior: Behavior normal. Behavior is cooperative.         Thought Content: Thought content normal.         Administrative Statements {?Quick Links Full Problem List * Level of Service * [facility]:71414}  I have spent a total time of 35 minutes in caring for this patient on the day of the visit/encounter including Diagnostic results, Prognosis, Risks and benefits of tx options, Instructions for management, Patient and family education, Importance of tx compliance, Risk factor reductions, Impressions, Counseling / Coordination of care, Documenting in the medical record, Reviewing / ordering tests, medicine, procedures  , and Obtaining or reviewing history  .

## 2024-12-23 ENCOUNTER — APPOINTMENT (OUTPATIENT)
Age: 69
End: 2024-12-23
Payer: MEDICARE

## 2024-12-23 ENCOUNTER — OFFICE VISIT (OUTPATIENT)
Age: 69
End: 2024-12-23
Payer: MEDICARE

## 2024-12-23 VITALS
WEIGHT: 155 LBS | SYSTOLIC BLOOD PRESSURE: 132 MMHG | HEART RATE: 68 BPM | OXYGEN SATURATION: 97 % | HEIGHT: 68 IN | TEMPERATURE: 98 F | DIASTOLIC BLOOD PRESSURE: 62 MMHG | BODY MASS INDEX: 23.49 KG/M2

## 2024-12-23 DIAGNOSIS — E06.3 HYPOTHYROIDISM DUE TO HASHIMOTO'S THYROIDITIS: Primary | ICD-10-CM

## 2024-12-23 DIAGNOSIS — R73.03 PREDIABETES: ICD-10-CM

## 2024-12-23 DIAGNOSIS — N18.31 STAGE 3A CHRONIC KIDNEY DISEASE (HCC): ICD-10-CM

## 2024-12-23 DIAGNOSIS — E06.3 HYPOTHYROIDISM DUE TO HASHIMOTO'S THYROIDITIS: ICD-10-CM

## 2024-12-23 DIAGNOSIS — E78.2 MIXED HYPERLIPIDEMIA: ICD-10-CM

## 2024-12-23 PROBLEM — I71.40 ABDOMINAL AORTIC ANEURYSM (AAA) WITHOUT RUPTURE (HCC): Status: ACTIVE | Noted: 2019-07-01

## 2024-12-23 PROBLEM — N18.32 STAGE 3B CHRONIC KIDNEY DISEASE (HCC): Status: ACTIVE | Noted: 2019-04-03

## 2024-12-23 LAB — TSH SERPL DL<=0.05 MIU/L-ACNC: 2.34 UIU/ML (ref 0.45–4.5)

## 2024-12-23 PROCEDURE — 36415 COLL VENOUS BLD VENIPUNCTURE: CPT

## 2024-12-23 PROCEDURE — 99214 OFFICE O/P EST MOD 30 MIN: CPT

## 2024-12-23 PROCEDURE — 84443 ASSAY THYROID STIM HORMONE: CPT

## 2024-12-23 NOTE — PATIENT INSTRUCTIONS
Thyroid labs today  I will renew Levothyroxine tomorrow after receipt of your lab work  Take Levothyroxine on an empty stomach, with only water. Wait approximately 30-60 minutes before consuming anything else besides water.  Medications like multivitamin, iron, calcium, tums, or antacids should be taken approximately 4 hrs apart from Levothyroxine as they may decrease its absorption.

## 2024-12-23 NOTE — ASSESSMENT & PLAN NOTE
Will check A1c prior to next visit.  Continue diet and lifestyle modification including attention to the amount and type of carbohydrates consumed as well as increased physical activity as tolerated.  Orders:  •  Hemoglobin A1C; Future

## 2024-12-23 NOTE — ASSESSMENT & PLAN NOTE
Overdue for thyroid function tests.     Presents clinically and biochemically euthyroid.    Reviewed thyroid ultrasound completed 10/6/2024 which demonstrated heterogeneous thyroid gland without nodules.    Continue Levothyroxine 88 mcg and repeat thyroid labs today. Await receipt of thyroid labs prior to making dose adjustments if necessary.  Orders:  •  T4, free; Future  •  TSH, 3rd generation; Future

## 2024-12-23 NOTE — ASSESSMENT & PLAN NOTE
Well overdue for lipid panel-reports he has completed recently.  Reports he has stopped his Zetia 10 mg daily due to the belief it is no longer required.  Encouraged to reach out to primary care provider for clarification on necessity of Zetia.

## 2024-12-23 NOTE — ASSESSMENT & PLAN NOTE
Follows with kidney care specialist.  Reports he has been following kidney friendly diet.  Continue to maintain good glycemic control.  Lab Results   Component Value Date    EGFR 56 (L) 09/13/2024    EGFR 54 (L) 08/27/2024    EGFR 47 08/27/2024    CREATININE 1.35 (H) 09/13/2024    CREATININE 1.40 (H) 08/27/2024    CREATININE 1.47 (H) 08/27/2024

## 2024-12-24 ENCOUNTER — RESULTS FOLLOW-UP (OUTPATIENT)
Age: 69
End: 2024-12-24

## 2024-12-24 DIAGNOSIS — E06.3 HYPOTHYROIDISM DUE TO HASHIMOTO'S THYROIDITIS: ICD-10-CM

## 2024-12-24 RX ORDER — LEVOTHYROXINE SODIUM 88 UG/1
88 TABLET ORAL DAILY
Qty: 100 TABLET | Refills: 1 | Status: SHIPPED | OUTPATIENT
Start: 2024-12-24

## 2024-12-26 NOTE — TELEPHONE ENCOUNTER
----- Message from NICOL Mercado sent at 12/24/2024  7:53 AM EST -----  TSH at goal.  Continue levothyroxine 88 mcg daily.  Repeat labs prior to next visit.

## 2024-12-26 NOTE — TELEPHONE ENCOUNTER
LVM - relayed [facility] message regarding TSH lab results.    Call if he has any questions.

## 2025-03-04 ENCOUNTER — NURSE TRIAGE (OUTPATIENT)
Age: 70
End: 2025-03-04

## 2025-03-04 DIAGNOSIS — E06.3 HYPOTHYROIDISM DUE TO HASHIMOTO'S THYROIDITIS: Primary | ICD-10-CM

## 2025-03-04 NOTE — TELEPHONE ENCOUNTER
"FOLLOW UP: call back from office     REASON FOR CONVERSATION: Hypertension    SYMPTOMS:   pt reported chills, fatigue, mild headache and fluctuating bp readings 146/82 hr 60, 138/76 hr 59, 152/85 hr 61, 138/79 hr 63, 158/84 hr 64 all taken today about a hour apart. Pt just wanted to make sure his thyroid levels were ok because previously in the past when he had similar symptoms, his thyroid levels were off. Confirms taking levothyroxine 88 mcg tablet daily    OTHER: [facility] after noting his systolic BP was in the 130s during his office visit with them . Pt reported that he made a call out to them as well and is awaiting to hear back from them. Confirms taking amLODIPine-benazepril (LOTREL) 5-40 MG  daily   Denies sob, chest pain, dizziness , lightheadedness, no fever, no vomiting or diarrhea. Aware to seek immediate medical attention is symptoms worsens.     DISPOSITION: pt would like update thyroid labs.         Reason for Disposition   Systolic BP >= 130 OR Diastolic >= 80, and history of heart problems, kidney disease, or diabetes mellitus    Answer Assessment - Initial Assessment Questions  1. BLOOD PRESSURE: \"What is your blood pressure?\" \"Did you take at least two measurements 5 minutes apart?\"       All todays bp readings 146/82 hr 60, 138/76 hr 59, 152/85 hr 61, 138/79 hr 63, 158/84 hr 64  2. ONSET: \"When did you take your blood pressure?\"      Yesterday m chills was 2-3 days ago   3. HOW: \"How did you take your blood pressure?\" (e.g., automatic home BP monitor, visiting nurse)      All todays bp readings 146/82 hr 60, 138/76 hr 59, 152/85 hr 61, 138/79 hr 63, 158/84 hr 64  4. HISTORY: \"Do you have a history of high blood pressure?\"      5. MEDICINES: \"Are you taking any medicines for blood pressure?\" \"Have you missed any doses recently?\"      Amlodpine with benapril   6. OTHER SYMPTOMS: \"Do you have any symptoms?\" (e.g., blurred vision, chest pain, " difficulty breathing, headache, weakness)      Decrease appetite , headache , chills    Protocols used: Blood Pressure - High-Adult-[place]

## 2025-03-05 ENCOUNTER — APPOINTMENT (OUTPATIENT)
Dept: LAB | Facility: HOSPITAL | Age: 70
End: 2025-03-05
Payer: MEDICARE

## 2025-03-05 LAB
T4 FREE SERPL-MCNC: 0.95 NG/DL (ref 0.61–1.12)
TSH SERPL DL<=0.05 MIU/L-ACNC: 2.13 UIU/ML (ref 0.45–4.5)

## 2025-03-05 PROCEDURE — 84443 ASSAY THYROID STIM HORMONE: CPT

## 2025-03-05 PROCEDURE — 36415 COLL VENOUS BLD VENIPUNCTURE: CPT

## 2025-03-05 PROCEDURE — 84439 ASSAY OF FREE THYROXINE: CPT

## 2025-03-05 NOTE — TELEPHONE ENCOUNTER

## 2025-03-06 ENCOUNTER — RESULTS FOLLOW-UP (OUTPATIENT)
Age: 70
End: 2025-03-06

## 2025-03-07 NOTE — TELEPHONE ENCOUNTER
----- Message from NICOL Mercado sent at 3/6/2025  7:14 AM EST -----  Labs reviewed.  Thyroid function tests at goal.  Unlikely thyroid is the source of his complaints.  Recommend he reach out to primary care for further evaluation.

## 2025-03-07 NOTE — TELEPHONE ENCOUNTER
LVM for patient relaying Celena's message.    Thyroid labs are at goal. Does not appear that his thyroid is the source of complaints. Recommend following up w/ PCP to investigate further.

## 2025-08-08 ENCOUNTER — APPOINTMENT (OUTPATIENT)
Dept: LAB | Facility: HOSPITAL | Age: 70
End: 2025-08-08
Payer: MEDICARE

## 2025-08-12 ENCOUNTER — OFFICE VISIT (OUTPATIENT)
Age: 70
End: 2025-08-12
Payer: MEDICARE

## 2025-08-12 PROBLEM — R73.03 PREDIABETES: Status: ACTIVE | Noted: 2024-08-27

## 2025-08-12 PROBLEM — I10 PRIMARY HYPERTENSION: Status: ACTIVE | Noted: 2023-10-19

## 2025-08-12 PROBLEM — K68.2 RETROPERITONEAL FIBROSIS: Status: ACTIVE | Noted: 2025-01-08
